# Patient Record
Sex: FEMALE | Race: WHITE | NOT HISPANIC OR LATINO | Employment: PART TIME | URBAN - METROPOLITAN AREA
[De-identification: names, ages, dates, MRNs, and addresses within clinical notes are randomized per-mention and may not be internally consistent; named-entity substitution may affect disease eponyms.]

---

## 2018-05-15 ENCOUNTER — APPOINTMENT (OUTPATIENT)
Dept: CT IMAGING | Facility: HOSPITAL | Age: 23
End: 2018-05-15

## 2018-05-15 ENCOUNTER — APPOINTMENT (OUTPATIENT)
Dept: ULTRASOUND IMAGING | Facility: HOSPITAL | Age: 23
End: 2018-05-15

## 2018-05-15 ENCOUNTER — HOSPITAL ENCOUNTER (EMERGENCY)
Facility: HOSPITAL | Age: 23
Discharge: HOME | End: 2018-05-15
Attending: EMERGENCY MEDICINE | Admitting: EMERGENCY MEDICINE

## 2018-05-15 ENCOUNTER — APPOINTMENT (OUTPATIENT)
Dept: GENERAL RADIOLOGY | Facility: HOSPITAL | Age: 23
End: 2018-05-15

## 2018-05-15 VITALS
OXYGEN SATURATION: 97 % | SYSTOLIC BLOOD PRESSURE: 97 MMHG | WEIGHT: 145 LBS | BODY MASS INDEX: 25.69 KG/M2 | HEIGHT: 63 IN | RESPIRATION RATE: 16 BRPM | TEMPERATURE: 98.1 F | HEART RATE: 95 BPM | DIASTOLIC BLOOD PRESSURE: 56 MMHG

## 2018-05-15 DIAGNOSIS — R16.0 HEPATOMEGALY: ICD-10-CM

## 2018-05-15 DIAGNOSIS — R79.89 ELEVATED LFTS: Primary | ICD-10-CM

## 2018-05-15 LAB
ALBUMIN SERPL-MCNC: 4.1 G/DL (ref 3.2–4.8)
ALBUMIN/GLOB SERPL: 1.2 G/DL (ref 1.5–2.5)
ALP SERPL-CCNC: 157 U/L (ref 25–100)
ALT SERPL W P-5'-P-CCNC: 465 U/L (ref 7–40)
ANION GAP SERPL CALCULATED.3IONS-SCNC: 4 MMOL/L (ref 3–11)
AST SERPL-CCNC: 328 U/L (ref 0–33)
B-HCG UR QL: NEGATIVE
BASOPHILS # BLD MANUAL: 0.14 10*3/MM3 (ref 0–0.2)
BASOPHILS NFR BLD AUTO: 1 % (ref 0–1)
BILIRUB SERPL-MCNC: 0.6 MG/DL (ref 0.3–1.2)
BUN BLD-MCNC: 9 MG/DL (ref 9–23)
BUN/CREAT SERPL: 12.9 (ref 7–25)
CALCIUM SPEC-SCNC: 9 MG/DL (ref 8.7–10.4)
CHLORIDE SERPL-SCNC: 104 MMOL/L (ref 99–109)
CO2 SERPL-SCNC: 30 MMOL/L (ref 20–31)
CREAT BLD-MCNC: 0.7 MG/DL (ref 0.6–1.3)
D DIMER PPP FEU-MCNC: 1.12 MG/L (FEU) (ref 0–0.5)
DEPRECATED RDW RBC AUTO: 46.1 FL (ref 37–54)
EOSINOPHIL # BLD MANUAL: 0.14 10*3/MM3 (ref 0.1–0.3)
EOSINOPHIL NFR BLD MANUAL: 1 % (ref 0–3)
ERYTHROCYTE [DISTWIDTH] IN BLOOD BY AUTOMATED COUNT: 14.6 % (ref 11.3–14.5)
GFR SERPL CREATININE-BSD FRML MDRD: 106 ML/MIN/1.73
GLOBULIN UR ELPH-MCNC: 3.4 GM/DL
GLUCOSE BLD-MCNC: 88 MG/DL (ref 70–100)
HAV IGM SERPL QL IA: NORMAL
HBV CORE IGM SERPL QL IA: NORMAL
HBV SURFACE AG SERPL QL IA: NORMAL
HCT VFR BLD AUTO: 37.6 % (ref 34.5–44)
HCV AB SER DONR QL: NORMAL
HGB BLD-MCNC: 12.3 G/DL (ref 11.5–15.5)
INTERNAL NEGATIVE CONTROL: REACTIVE
INTERNAL POSITIVE CONTROL: REACTIVE
LIPASE SERPL-CCNC: 31 U/L (ref 6–51)
LYMPHOCYTES # BLD MANUAL: 8.82 10*3/MM3 (ref 0.6–4.8)
LYMPHOCYTES NFR BLD MANUAL: 65 % (ref 24–44)
LYMPHOCYTES NFR BLD MANUAL: 9 % (ref 0–12)
Lab: NORMAL
MCH RBC QN AUTO: 28 PG (ref 27–31)
MCHC RBC AUTO-ENTMCNC: 32.7 G/DL (ref 32–36)
MCV RBC AUTO: 85.5 FL (ref 80–99)
MONOCYTES # BLD AUTO: 1.22 10*3/MM3 (ref 0–1)
NEUTROPHILS # BLD AUTO: 2.71 10*3/MM3 (ref 1.5–8.3)
NEUTROPHILS NFR BLD MANUAL: 13 % (ref 41–71)
NEUTS BAND NFR BLD MANUAL: 7 % (ref 0–5)
PLAT MORPH BLD: NORMAL
PLATELET # BLD AUTO: 205 10*3/MM3 (ref 150–450)
PMV BLD AUTO: 11.7 FL (ref 6–12)
POTASSIUM BLD-SCNC: 3.9 MMOL/L (ref 3.5–5.5)
PROT SERPL-MCNC: 7.5 G/DL (ref 5.7–8.2)
RBC # BLD AUTO: 4.4 10*6/MM3 (ref 3.89–5.14)
RBC MORPH BLD: NORMAL
SODIUM BLD-SCNC: 138 MMOL/L (ref 132–146)
VARIANT LYMPHS NFR BLD MANUAL: 4 % (ref 0–5)
WBC MORPH BLD: NORMAL
WBC NRBC COR # BLD: 13.57 10*3/MM3 (ref 4.5–13.5)

## 2018-05-15 PROCEDURE — 76705 ECHO EXAM OF ABDOMEN: CPT

## 2018-05-15 PROCEDURE — 71275 CT ANGIOGRAPHY CHEST: CPT

## 2018-05-15 PROCEDURE — 0 IOPAMIDOL PER 1 ML: Performed by: EMERGENCY MEDICINE

## 2018-05-15 PROCEDURE — 83690 ASSAY OF LIPASE: CPT | Performed by: PHYSICIAN ASSISTANT

## 2018-05-15 PROCEDURE — 71046 X-RAY EXAM CHEST 2 VIEWS: CPT

## 2018-05-15 PROCEDURE — 96374 THER/PROPH/DIAG INJ IV PUSH: CPT

## 2018-05-15 PROCEDURE — 85007 BL SMEAR W/DIFF WBC COUNT: CPT | Performed by: PHYSICIAN ASSISTANT

## 2018-05-15 PROCEDURE — 80074 ACUTE HEPATITIS PANEL: CPT | Performed by: PHYSICIAN ASSISTANT

## 2018-05-15 PROCEDURE — 99284 EMERGENCY DEPT VISIT MOD MDM: CPT

## 2018-05-15 PROCEDURE — 96375 TX/PRO/DX INJ NEW DRUG ADDON: CPT

## 2018-05-15 PROCEDURE — 25010000002 HYDROMORPHONE PER 4 MG

## 2018-05-15 PROCEDURE — 85379 FIBRIN DEGRADATION QUANT: CPT | Performed by: PHYSICIAN ASSISTANT

## 2018-05-15 PROCEDURE — 25010000002 ONDANSETRON PER 1 MG

## 2018-05-15 PROCEDURE — 80053 COMPREHEN METABOLIC PANEL: CPT | Performed by: PHYSICIAN ASSISTANT

## 2018-05-15 PROCEDURE — 85025 COMPLETE CBC W/AUTO DIFF WBC: CPT | Performed by: PHYSICIAN ASSISTANT

## 2018-05-15 RX ORDER — HYDROMORPHONE HYDROCHLORIDE 1 MG/ML
0.5 INJECTION, SOLUTION INTRAMUSCULAR; INTRAVENOUS; SUBCUTANEOUS ONCE
Status: COMPLETED | OUTPATIENT
Start: 2018-05-15 | End: 2018-05-15

## 2018-05-15 RX ORDER — ONDANSETRON 2 MG/ML
4 INJECTION INTRAMUSCULAR; INTRAVENOUS ONCE
Status: COMPLETED | OUTPATIENT
Start: 2018-05-15 | End: 2018-05-15

## 2018-05-15 RX ORDER — TRAMADOL HYDROCHLORIDE 50 MG/1
50 TABLET ORAL EVERY 6 HOURS PRN
Qty: 12 TABLET | Refills: 0 | Status: SHIPPED | OUTPATIENT
Start: 2018-05-15 | End: 2018-05-16 | Stop reason: HOSPADM

## 2018-05-15 RX ORDER — ONDANSETRON 2 MG/ML
INJECTION INTRAMUSCULAR; INTRAVENOUS
Status: COMPLETED
Start: 2018-05-15 | End: 2018-05-15

## 2018-05-15 RX ADMIN — HYDROMORPHONE HYDROCHLORIDE 0.5 MG: 1 INJECTION, SOLUTION INTRAMUSCULAR; INTRAVENOUS; SUBCUTANEOUS at 12:56

## 2018-05-15 RX ADMIN — IOPAMIDOL 50 ML: 755 INJECTION, SOLUTION INTRAVENOUS at 13:09

## 2018-05-15 RX ADMIN — ONDANSETRON 4 MG: 2 INJECTION INTRAMUSCULAR; INTRAVENOUS at 12:54

## 2018-05-15 NOTE — ED PROVIDER NOTES
Subjective   Patient's been having pain in her right upper quadrant going on for about 4 days.  Patient ports she was initially seen yesterday by her primary care doctor was noted to have elevated LFTs.  She had x-rays of the right kidney which were negative should a chest x-ray was negative.  Her liver functions are patient patient's family physician thought maybe that she had hepatitis A she works in  setting so as she had her sent to the emergency department for further evaluation.  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  She's had no nausea or vomiting associated with this.  Patient reports a pleuritic pain in the right posterior chest wall's well.  Patient reports she's had no productive cough no wheezing and no trauma to this area.  She takes no regular medications.  Palpation deftly exacerbates her pain as well as leaning to the right.  Alleviation of the pain done by not moving.        History provided by:  Patient   used: No    Abdominal Pain   Pain location:  R flank and RUQ  Pain quality: sharp and shooting    Pain radiates to:  Chest  Pain severity:  Moderate  Onset quality:  Gradual  Duration:  4 days  Timing:  Intermittent  Progression:  Waxing and waning  Chronicity:  New  Context: not alcohol use, not eating, not medication withdrawal, not previous surgeries, not recent illness, not recent travel, not retching, not sick contacts, not suspicious food intake and not trauma    Relieved by:  Not moving  Worsened by:  Movement  Ineffective treatments:  None tried  Associated symptoms: anorexia    Associated symptoms: no chest pain, no constipation, no dysuria, no fatigue, no fever, no flatus, no nausea, no shortness of breath, no vaginal bleeding, no vaginal discharge and no vomiting    Risk factors: no alcohol abuse        Review of Systems   Constitutional: Negative for  fatigue and fever.   Respiratory: Negative for chest tightness, shortness of breath and wheezing.    Cardiovascular: Negative for chest pain.   Gastrointestinal: Positive for abdominal pain and anorexia. Negative for constipation, flatus, nausea and vomiting.   Genitourinary: Negative for dysuria, vaginal bleeding and vaginal discharge.   Musculoskeletal: Negative for back pain and neck stiffness.   Skin: Negative for pallor and rash.   Neurological: Negative for dizziness and weakness.   Hematological: Negative for adenopathy.   Psychiatric/Behavioral: Negative.    All other systems reviewed and are negative.      History reviewed. No pertinent past medical history.    No Known Allergies    Past Surgical History:   Procedure Laterality Date   • EAR TUBES     • TONSILLECTOMY AND ADENOIDECTOMY         Family History   Problem Relation Age of Onset   • Esophageal cancer Paternal Grandfather        Social History     Social History   • Marital status: Single     Social History Main Topics   • Smoking status: Never Smoker   • Smokeless tobacco: Never Used   • Alcohol use No   • Drug use: No     Other Topics Concern   • Not on file           Objective   Physical Exam   Constitutional: She is oriented to person, place, and time. She appears well-developed and well-nourished.   HENT:   Head: Normocephalic and atraumatic.   Right Ear: External ear normal.   Left Ear: External ear normal.   Nose: Nose normal.   Mouth/Throat: Oropharynx is clear and moist.   Eyes: Conjunctivae and EOM are normal. Pupils are equal, round, and reactive to light. No scleral icterus.   Neck: Normal range of motion. No thyromegaly present.   Cardiovascular: Normal rate, regular rhythm and normal heart sounds.    Pulmonary/Chest: Effort normal and breath sounds normal. No respiratory distress. She has no wheezes. She has no rales. She exhibits no tenderness.   Abdominal: Soft. Bowel sounds are normal. She exhibits no distension. There is tenderness  (tenderness right upper quadrant.  She has a positive Louise sign. ).   Musculoskeletal: Normal range of motion.   Lymphadenopathy:     She has no cervical adenopathy.   Neurological: She is alert and oriented to person, place, and time. She has normal reflexes. She displays normal reflexes. No cranial nerve deficit. Coordination normal.   Skin: Skin is warm and dry.   No rash or lesions.   Psychiatric: She has a normal mood and affect. Her behavior is normal. Judgment and thought content normal.   Nursing note and vitals reviewed.      Procedures           ED Course  ED Course as of May 20 2247   Tue May 15, 2018   1255 Hep A IgM: Non-Reactive [LORENA]   1416 Discussed the findings with the patient and her parents.  She must stay away from a hepatic toxic medications as well as alcohol.  She referred to gastrology on-call.  Follow-up with her primary care doctor as well.  [LORENA]      ED Course User Index  [LORENA] RAHAT Lu        No results found for this or any previous visit (from the past 24 hour(s)).  Note: In addition to lab results from this visit, the labs listed above may include labs taken at another facility or during a different encounter within the last 24 hours. Please correlate lab times with ED admission and discharge times for further clarification of the services performed during this visit.    CT Angiogram Chest With Contrast   Final Result   1.  No PE.   2.  No acute intrathoracic findings.   3.  Hepatosplenomegaly on partially imaged abdomen.       D:  05/15/2018   E:  05/16/2018               This report was finalized on 5/17/2018 8:28 AM by Dr. Ivan Lorenz.          XR Chest 2 View   Final Result   No acute cardiopulmonary process.       D:  05/15/2018   E:  05/15/2018       This report was finalized on 5/15/2018 4:48 PM by Dr. Ivan Lorenz.          US Gallbladder   Final Result   1.  Liver appears prominent for size without focal lesion.   2.  Incidental note of splenomegaly.   3.   Microlithiasis and/or sludge within the dependent portion of the   gallbladder, however, no evidence for gallbladder wall thickening or   pericholecystic fluid.       D:  05/15/2018   E:  05/15/2018                This report was finalized on 5/15/2018 4:47 PM by Dr. Ivan Lorenz.            Vitals:    05/15/18 1001 05/15/18 1230 05/15/18 1404 05/15/18 1449   BP:  114/82 114/69 97/56   BP Location:   Left arm    Patient Position:   Lying    Pulse:  88 85 95   Resp:    16   Temp:       TempSrc:       SpO2: 95% 97% 97% 97%   Weight:       Height:         Medications   HYDROmorphone (DILAUDID) injection 0.5 mg (0.5 mg Intravenous Given 5/15/18 1256)   ondansetron (ZOFRAN) injection 4 mg (4 mg Intravenous Given 5/15/18 1254)   iopamidol (ISOVUE-370) 76 % injection 100 mL (50 mL Intravenous Given 5/15/18 1309)     ECG/EMG Results (last 24 hours)     ** No results found for the last 24 hours. **                  MDM  Number of Diagnoses or Management Options  Elevated LFTs: new and requires workup  Hepatomegaly: new and requires workup     Amount and/or Complexity of Data Reviewed  Clinical lab tests: reviewed and ordered  Tests in the radiology section of CPT®: reviewed and ordered  Tests in the medicine section of CPT®: ordered and reviewed  Discuss the patient with other providers: yes    Patient Progress  Patient progress: stable        Final diagnoses:   Elevated LFTs   Hepatomegaly            RAHAT Lu  05/20/18 3992

## 2018-05-15 NOTE — DISCHARGE INSTRUCTIONS
Please call ASAP to arrange outpatient follow up with one of the following gastroenterologists:    Dom Jensen MD or Quan May MD  [address]  [place] [address] [phone]    Or    If unable to make a timely appointment with Dr. Jesnen or Dr. Mya, please follow up with one of these gastroenterologists:    Shalom Sommer MD or Flavio Gray MD  [id number]  [place] [address] [phone]    If follow up with these specialists cannot be arranged soon, please also follow up with a primary care provider, next available.

## 2018-05-16 ENCOUNTER — OFFICE VISIT (OUTPATIENT)
Dept: GASTROENTEROLOGY | Facility: CLINIC | Age: 23
End: 2018-05-16

## 2018-05-16 ENCOUNTER — LAB (OUTPATIENT)
Dept: LAB | Facility: HOSPITAL | Age: 23
End: 2018-05-16

## 2018-05-16 VITALS
TEMPERATURE: 98.1 F | OXYGEN SATURATION: 97 % | HEIGHT: 63 IN | WEIGHT: 142.6 LBS | BODY MASS INDEX: 25.27 KG/M2 | SYSTOLIC BLOOD PRESSURE: 102 MMHG | HEART RATE: 98 BPM | DIASTOLIC BLOOD PRESSURE: 68 MMHG

## 2018-05-16 DIAGNOSIS — R10.11 RUQ ABDOMINAL PAIN: ICD-10-CM

## 2018-05-16 DIAGNOSIS — R11.0 NAUSEA: ICD-10-CM

## 2018-05-16 DIAGNOSIS — R79.89 LFT ELEVATION: ICD-10-CM

## 2018-05-16 DIAGNOSIS — R79.89 LFT ELEVATION: Primary | ICD-10-CM

## 2018-05-16 LAB
ALBUMIN SERPL-MCNC: 4.4 G/DL (ref 3.2–4.8)
ALBUMIN/GLOB SERPL: 1.3 G/DL (ref 1.5–2.5)
ALP SERPL-CCNC: 181 U/L (ref 25–100)
ALT SERPL W P-5'-P-CCNC: 728 U/L (ref 7–40)
AMPHET+METHAMPHET UR QL: NEGATIVE
AMPHETAMINES UR QL: NEGATIVE
ANION GAP SERPL CALCULATED.3IONS-SCNC: 6 MMOL/L (ref 3–11)
AST SERPL-CCNC: 493 U/L (ref 0–33)
BARBITURATES UR QL SCN: NEGATIVE
BENZODIAZ UR QL SCN: NEGATIVE
BILIRUB SERPL-MCNC: 0.8 MG/DL (ref 0.3–1.2)
BILIRUB UR QL STRIP: NEGATIVE
BUN BLD-MCNC: 11 MG/DL (ref 9–23)
BUN/CREAT SERPL: 13.8 (ref 7–25)
BUPRENORPHINE SERPL-MCNC: NEGATIVE NG/ML
CALCIUM SPEC-SCNC: 9.1 MG/DL (ref 8.7–10.4)
CANNABINOIDS SERPL QL: NEGATIVE
CHLORIDE SERPL-SCNC: 101 MMOL/L (ref 99–109)
CLARITY UR: CLEAR
CO2 SERPL-SCNC: 30 MMOL/L (ref 20–31)
COCAINE UR QL: NEGATIVE
COLOR UR: YELLOW
CREAT BLD-MCNC: 0.8 MG/DL (ref 0.6–1.3)
GFR SERPL CREATININE-BSD FRML MDRD: 91 ML/MIN/1.73
GGT SERPL-CCNC: 126 U/L (ref 0–37)
GLOBULIN UR ELPH-MCNC: 3.5 GM/DL
GLUCOSE BLD-MCNC: 95 MG/DL (ref 70–100)
GLUCOSE UR STRIP-MCNC: NEGATIVE MG/DL
HBV SURFACE AB SER RIA-ACNC: NORMAL
HGB UR QL STRIP.AUTO: NEGATIVE
KETONES UR QL STRIP: NEGATIVE
LEUKOCYTE ESTERASE UR QL STRIP.AUTO: NEGATIVE
METHADONE UR QL SCN: NEGATIVE
NITRITE UR QL STRIP: NEGATIVE
OPIATES UR QL: NEGATIVE
OXYCODONE UR QL SCN: NEGATIVE
PCP UR QL SCN: NEGATIVE
PH UR STRIP.AUTO: 6 [PH] (ref 5–8)
POTASSIUM BLD-SCNC: 4.1 MMOL/L (ref 3.5–5.5)
PROPOXYPH UR QL: NEGATIVE
PROT SERPL-MCNC: 7.9 G/DL (ref 5.7–8.2)
PROT UR QL STRIP: NEGATIVE
SODIUM BLD-SCNC: 137 MMOL/L (ref 132–146)
SP GR UR STRIP: 1.01 (ref 1–1.03)
TRICYCLICS UR QL SCN: NEGATIVE
UROBILINOGEN UR QL STRIP: NORMAL

## 2018-05-16 PROCEDURE — 99204 OFFICE O/P NEW MOD 45 MIN: CPT | Performed by: NURSE PRACTITIONER

## 2018-05-16 PROCEDURE — 82977 ASSAY OF GGT: CPT

## 2018-05-16 PROCEDURE — 80306 DRUG TEST PRSMV INSTRMNT: CPT

## 2018-05-16 PROCEDURE — 86431 RHEUMATOID FACTOR QUANT: CPT

## 2018-05-16 PROCEDURE — 86430 RHEUMATOID FACTOR TEST QUAL: CPT

## 2018-05-16 PROCEDURE — 80053 COMPREHEN METABOLIC PANEL: CPT

## 2018-05-16 PROCEDURE — 83516 IMMUNOASSAY NONANTIBODY: CPT

## 2018-05-16 PROCEDURE — 81003 URINALYSIS AUTO W/O SCOPE: CPT

## 2018-05-16 PROCEDURE — 86665 EPSTEIN-BARR CAPSID VCA: CPT | Performed by: NURSE PRACTITIONER

## 2018-05-16 PROCEDURE — 86255 FLUORESCENT ANTIBODY SCREEN: CPT

## 2018-05-16 PROCEDURE — 86706 HEP B SURFACE ANTIBODY: CPT

## 2018-05-16 PROCEDURE — 86038 ANTINUCLEAR ANTIBODIES: CPT

## 2018-05-16 PROCEDURE — 86663 EPSTEIN-BARR ANTIBODY: CPT | Performed by: NURSE PRACTITIONER

## 2018-05-16 PROCEDURE — 86645 CMV ANTIBODY IGM: CPT

## 2018-05-16 PROCEDURE — 87522 HEPATITIS C REVRS TRNSCRPJ: CPT

## 2018-05-16 PROCEDURE — 36415 COLL VENOUS BLD VENIPUNCTURE: CPT

## 2018-05-16 PROCEDURE — 82784 ASSAY IGA/IGD/IGG/IGM EACH: CPT

## 2018-05-16 PROCEDURE — 86376 MICROSOMAL ANTIBODY EACH: CPT

## 2018-05-16 PROCEDURE — 86708 HEPATITIS A ANTIBODY: CPT

## 2018-05-16 PROCEDURE — 86664 EPSTEIN-BARR NUCLEAR ANTIGEN: CPT | Performed by: NURSE PRACTITIONER

## 2018-05-16 PROCEDURE — 82390 ASSAY OF CERULOPLASMIN: CPT

## 2018-05-16 RX ORDER — FLUTICASONE PROPIONATE 50 MCG
2 SPRAY, SUSPENSION (ML) NASAL AS NEEDED
COMMUNITY
End: 2022-09-19

## 2018-05-16 RX ORDER — ONDANSETRON 4 MG/1
4 TABLET, ORALLY DISINTEGRATING ORAL 4 TIMES DAILY PRN
Qty: 30 TABLET | Refills: 1 | Status: SHIPPED | OUTPATIENT
Start: 2018-05-16 | End: 2022-06-01

## 2018-05-16 NOTE — PROGRESS NOTES
GASTROENTEROLOGY OUTPATIENT NEW PATIENT NOTE  Patient: JOSE ARREDONDO : 1995  Date of Service: 2018  Dear David GIANG  Thank you for your referral of this patient for evaluation of LFT elevation  CC: Elevated Hepatic Enzymes  Assessment/Plan                                             ASSESSMENT & PLANS     Acute, severe LFT elevation likely to be related to autoimmune hepatitis versus mono/acute EBV infection versus biliary cause  RUQ abdominal pain related to problem #1  Microlithiasis and/or sludge, but no evidence for gallbladder wall thickening or biliary dilatation  Splenomgaly, but no thrombopenia. Measuring 15.7 cm in craniocaudal dimension per US  -     Hepatitis A Antibody, Total; Future  -     Hepatitis B Surface Antibody; Future  -     Gamma GT; Future  -     Mitochondrial Antibodies, M2; Future  -     Rheumatoid Factor; Future  -     Antinuclear Antibody With Reflex Cascade; Future  -     Anti-microsomal Antibody; Future  -     Anti-Smooth Muscle Antibody Titer; Future  -     Ceruloplasmin; Future  -     Celiac Disease Panel; Future  -     UA / M With / Rflx Culture([facility] ONLY) - Urine, Clean Catch; Future  -     Cytomegalovirus Antibody, IgM; Future  -     Magalys-Barr Virus VCA Antibody Panel  -     Soluble Liver Ag (IgG Ab); Future  -     Urine Drug Screen - Urine, Clean Catch; Future  -     Hepatitis C RNA, Quantitative, PCR (graph); Future  -     Comprehensive Metabolic Panel; Future  -     NM HIDA Scan With Pharmacological Intervention; Future  -     May take PRN Ultram for abd pain as given by ER yesterday.      Nausea  -     Start ondansetron ODT (ZOFRAN-ODT) 4 MG disintegrating tablet; Place 1 tablet under the tongue 4 (Four) Times a Day As Needed for Nausea or Vomiting.    Follow Up: Patient's case is discussed w/ Dr May, who agrees to plan above.  Pt is instructed if she has worsening abdominal pain, become jaundice, fever, patient is to report to the ER  immediately.   Pt is instructed to avoid activities (ie contact sports) that can cause injury to the spleen.   Pt is instructed to for fluid and stay hydrated.  I suggested that pt takes Zofran TID for a couple days first then PRN    DISCUSSION: The above plan was delineated in details with patient and mother and all questions and concerns were answered.  Patient is also given contact information.  Patient is to return as scheduled or sooner if new problems arise  Subjective                                                     SUBJECTIVE   History of Present Illness  Ms. Dacia Ortiz is a 23 y.o. female presents to the clinic today for an evaluation of Elevated Hepatic Enzymes  Pt presented w/ 1 wk of progressively worsening R flank/RUQ abd pain, anorexia, nausea, then subjective/low grade fever (100.0) yesterday. Work up at PCP’s office showed elevated LFT.  Patient went to the ER yesterday. Kidney stone and acute hepatitis infection was ruled out.  D-dimer was elevated, but CT scans chest ruled out PE.  Gallbladder ultrasound showed sludge, nonobstructing kidney stone, and incidental finding of splenomegaly    Pt was given Tramadol PRN, and urgently referred to us for evaluation    Patient reports of moderately severe, sharp shooting R flank/RUQ abd pain. Sometimes radiates to chest.  Pain is constant, but worsens with moving, activity, and tender to touch.  Palpation deftly exacerbates her pain as well as leaning to the right.  Alleviation of the pain done by not moving.  Laying still makes pain better.  Correlation of pain with eating or bowel movement. Has been eating very little due to nausea. At baseline, pt is able to have have a BM once every other day w/ normal stool, but pt has been having less frequent BMs this past wk d/t decreased intake. No jaundice.  +++Dark urine.  No dysuria.  No urinary frequency.  UTI has been ruled out.  Patient reports that  Increased fluid intake to stay hydrated.  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  She takes no regular medications.   Mother has the celiac gene. Fam hx of thyroid dz, but not sure if it's autoimmune. No alcohol use. No previous surgeries. No recent illness/  No known sick contacts. No suspicious food     ROS:Review of Systems   Constitutional: Positive for appetite change, fatigue and fever. Negative for activity change and unexpected weight change.   HENT: Negative for hearing loss, trouble swallowing and voice change.    Eyes: Negative for visual disturbance.   Respiratory: Positive for cough. Negative for choking, chest tightness and shortness of breath.    Cardiovascular: Negative for chest pain.   Gastrointestinal: Positive for abdominal distention (bloating), abdominal pain and nausea. Negative for anal bleeding, blood in stool, constipation, diarrhea, rectal pain and vomiting.   Endocrine: Negative for polydipsia and polyphagia.   Genitourinary: Negative.    Musculoskeletal: Positive for back pain. Negative for gait problem and joint swelling.   Skin: Negative for color change and rash.   Allergic/Immunologic: Negative for food allergies.   Neurological: Negative for dizziness, seizures and speech difficulty.   Hematological: Negative for adenopathy.   Psychiatric/Behavioral: Negative for confusion.     Subjective   PAST MED HX: Pt  has no past medical history on file.  PAST SURG HX: Pt  has a past surgical history that includes Ear Tubes Removal and tonsillectomy and adenoidectomy.  FAM HX: family history includes Esophageal cancer in her paternal grandfather.  SOC HX: Pt  reports that she has never smoked. She has never used smokeless tobacco. She reports that she does not drink alcohol or use drugs.  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  In school [employer]  Objective                                                           OBJECTIVE    Allergy: Pt has No Known Allergies.  MEDS: •  fluticasone (FLONASE) 50 MCG/ACT nasal spray, 2 sprays into each nostril As Needed for Rhinitis., Disp: , Rfl:   Lab Results   Component Value Date    WBC 13.57 (H) 05/15/2018    HGB 12.3 05/15/2018    HCT 37.6 05/15/2018    MCV 85.5 05/15/2018    MCHC 32.7 05/15/2018     05/15/2018     Lab Results   Component Value Date     05/15/2018    K 3.9 05/15/2018     05/15/2018    CO2 30.0 05/15/2018    BUN 9 05/15/2018    CREATININE 0.70 05/15/2018    EGFRIFNONA 106 05/15/2018    GLUCOSE 88 05/15/2018    CALCIUM 9.0 05/15/2018    ANIONGAP 4.0 05/15/2018     Lab Results   Component Value Date     (H) 05/15/2018     (H) 05/15/2018    ALKPHOS 157 (H) 05/15/2018    BILITOT 0.6 05/15/2018    PROTEINTOT 7.5 05/15/2018    ALBUMIN 4.10 05/15/2018     Lab Results   Component Value Date    LIPASE 31 05/15/2018     Lab Results   Component Value Date    HEPAIGM Non-Reactive 05/15/2018    HEPBSAG Non-Reactive 05/15/2018    HEPBIGMCORE Non-Reactive 05/15/2018    HEPCAB Non-Reactive 05/15/2018     EXAMINATION: US GALLBLADDER-05/15/2018:  FINDINGS:   Visualized portions of the pancreas within normal limits.     Liver demonstrates normal echogenicity and echotexture and noted with  prominence of size.     Gallbladder is present without evidence for cholelithiasis, however,  microlithiasis and/or sludge in the dependent portion. No gallbladder  wall thickening or pericholecystic fluid.     Common bile duct measures 5 mm in diameter at the level of the nataliya  hepatis.     Right kidney measures 10.8 cm in length without evidence for  hydronephrosis.     Incidental note of enlarged spleen measuring 15.7 cm in craniocaudal  dimension on limited imaging.     IMPRESSION:  1.  Liver appears prominent for size without focal lesion.  2.  Incidental note of splenomegaly.  3.  Microlithiasis and/or sludge within the dependent portion of the  gallbladder, however, no  evidence for gallbladder wall thickening or  pericholecystic fluid.    Wt Readings from Last 5 Encounters:   05/16/18 64.7 kg (142 lb 9.6 oz)   05/15/18 65.8 kg (145 lb)   body mass index is 25.26 kg/m².,Temp: 98.1 °F (36.7 °C),BP: 102/68,Heart Rate: 98   Physical Exam  General Well developed; well nourished; no acute distress.   ENT Oral mucosa pink and moist without thrush or lesions.    Neck Neck supple; trachea midline. No thyromegaly   Resp CTA; no rhonchi, rales, or wheezes.  Respiration effort normal  CV RRR; normal S1, S2; no M/R/G. No lower extremity edema  GI Abd soft,moderately tender to palpation, but no guarding or rebound, ND, normal active bowel sounds.  No HSM.  No abd hernia  Skin No rash; no lesions; no bruises.  Skin turgor normal  Musc No clubbing; no cyanosis.  Gait steady  Psych Oriented to time, place, and person.  Appropriate affect  Thank you kindly for allowing me to be part of this patient’s care.    Pt care team: Airam CARDONA & Pritesh Cowart [facility]  05/16/183:18 PM  [facility]--Gastroenterology  [phone]    CC: David GIANG

## 2018-05-17 ENCOUNTER — TELEPHONE (OUTPATIENT)
Dept: GASTROENTEROLOGY | Facility: CLINIC | Age: 23
End: 2018-05-17

## 2018-05-17 DIAGNOSIS — K75.9 HEPATITIS: Primary | ICD-10-CM

## 2018-05-17 LAB
ACTIN IGG SERPL-ACNC: 23 UNITS (ref 0–19)
ALP LIVER CFR SERPL: <1 UNITS (ref 0–20)
ANA SER QL: NEGATIVE
CERULOPLASMIN SERPL-MCNC: 37.5 MG/DL (ref 19–39)
CMV IGM SERPL IA-ACNC: 49.1 AU/ML (ref 0–29.9)
DEPRECATED MITOCHONDRIA M2 IGG SER-ACNC: <20 UNITS (ref 0–20)
EBV EA IGG SER-ACNC: >150 U/ML (ref 0–8.9)
EBV NA IGG SER IA-ACNC: <18 U/ML (ref 0–17.9)
EBV VCA IGG SER-ACNC: 82 U/ML (ref 0–17.9)
EBV VCA IGM SER-ACNC: >160 U/ML (ref 0–35.9)
ENDOMYSIUM IGA SER QL: NEGATIVE
HAV AB SER QL IA: POSITIVE
IGA SERPL-MCNC: 286 MG/DL (ref 87–352)
INTERPRETATION: ABNORMAL
Lab: NORMAL
RHEUMATOID FACT SERPL-ACNC: POSITIVE [IU]/ML
RHEUMATOID FACT TITR SER: NORMAL IU/ML
TTG IGA SER-ACNC: <2 U/ML (ref 0–3)

## 2018-05-17 NOTE — TELEPHONE ENCOUNTER
Patient left voicemail with questions about her rx that Airam gave her yesterday. Called patient back. Patient states she spoke with Pritesh and got it fixed.

## 2018-05-18 ENCOUNTER — APPOINTMENT (OUTPATIENT)
Dept: NUCLEAR MEDICINE | Facility: HOSPITAL | Age: 23
End: 2018-05-18

## 2018-05-18 LAB
HCV RNA SERPL NAA+PROBE-ACNC: NORMAL IU/ML
TEST INFORMATION: NORMAL

## 2018-05-22 ENCOUNTER — TELEPHONE (OUTPATIENT)
Dept: GASTROENTEROLOGY | Facility: CLINIC | Age: 23
End: 2018-05-22

## 2018-05-22 LAB — SOLUBLE LIVER IGG SER IA-ACNC: 1.7 UNITS (ref 0–20)

## 2018-05-22 NOTE — TELEPHONE ENCOUNTER
Airam,  Patient stated that she doesn't have the pain & nausea. Only when she is up doing too much activity. Also informed patient that she doesn't have to get Hida Scan.

## 2018-05-22 NOTE — TELEPHONE ENCOUNTER
When Dr May talked to pt last Thur 5/17, he also told her that she needs her labs repeated this wk.  Order is in. Pls come and get it this tomorrow (Wed) or Thur.

## 2018-05-22 NOTE — TELEPHONE ENCOUNTER
Airam,  Patient was a know show for Hida Scan appointment. She stated that she talked to Dr May last week and he stated that her labs showed Mono; that may be the cause of her abnormal liver enzymes. He didn't think she needed the Hida Scan. Patient stated if you still wanted her to get it done. She will. Thanks

## 2018-05-23 ENCOUNTER — LAB (OUTPATIENT)
Dept: LAB | Facility: HOSPITAL | Age: 23
End: 2018-05-23

## 2018-05-23 ENCOUNTER — APPOINTMENT (OUTPATIENT)
Dept: NUCLEAR MEDICINE | Facility: HOSPITAL | Age: 23
End: 2018-05-23

## 2018-05-23 DIAGNOSIS — K75.9 HEPATITIS: ICD-10-CM

## 2018-05-23 LAB
ALBUMIN SERPL-MCNC: 4.4 G/DL (ref 3.2–4.8)
ALBUMIN/GLOB SERPL: 1.4 G/DL (ref 1.5–2.5)
ALP SERPL-CCNC: 137 U/L (ref 25–100)
ALT SERPL W P-5'-P-CCNC: 249 U/L (ref 7–40)
ANION GAP SERPL CALCULATED.3IONS-SCNC: 4 MMOL/L (ref 3–11)
AST SERPL-CCNC: 81 U/L (ref 0–33)
BILIRUB SERPL-MCNC: 0.6 MG/DL (ref 0.3–1.2)
BUN BLD-MCNC: 11 MG/DL (ref 9–23)
BUN/CREAT SERPL: 13.8 (ref 7–25)
CALCIUM SPEC-SCNC: 9.2 MG/DL (ref 8.7–10.4)
CHLORIDE SERPL-SCNC: 104 MMOL/L (ref 99–109)
CO2 SERPL-SCNC: 32 MMOL/L (ref 20–31)
CREAT BLD-MCNC: 0.8 MG/DL (ref 0.6–1.3)
GFR SERPL CREATININE-BSD FRML MDRD: 91 ML/MIN/1.73
GLOBULIN UR ELPH-MCNC: 3.1 GM/DL
GLUCOSE BLD-MCNC: 87 MG/DL (ref 70–100)
POTASSIUM BLD-SCNC: 5.2 MMOL/L (ref 3.5–5.5)
PROT SERPL-MCNC: 7.5 G/DL (ref 5.7–8.2)
SODIUM BLD-SCNC: 140 MMOL/L (ref 132–146)

## 2018-05-23 PROCEDURE — 80053 COMPREHEN METABOLIC PANEL: CPT

## 2018-05-23 PROCEDURE — 82784 ASSAY IGA/IGD/IGG/IGM EACH: CPT

## 2018-05-23 PROCEDURE — 36415 COLL VENOUS BLD VENIPUNCTURE: CPT

## 2018-05-24 DIAGNOSIS — R79.89 ABNORMAL LIVER FUNCTION TESTS: Primary | ICD-10-CM

## 2018-05-24 LAB
IGA SERPL-MCNC: 263 MG/DL (ref 87–352)
IGG SERPL-MCNC: 1306 MG/DL (ref 700–1600)
IGM SERPL-MCNC: 251 MG/DL (ref 26–217)

## 2018-06-08 ENCOUNTER — LAB (OUTPATIENT)
Dept: LAB | Facility: HOSPITAL | Age: 23
End: 2018-06-08

## 2018-06-08 DIAGNOSIS — R79.89 ABNORMAL LIVER FUNCTION TESTS: ICD-10-CM

## 2018-06-08 LAB
ALBUMIN SERPL-MCNC: 4.48 G/DL (ref 3.2–4.8)
ALBUMIN/GLOB SERPL: 1.5 G/DL (ref 1.5–2.5)
ALP SERPL-CCNC: 74 U/L (ref 25–100)
ALT SERPL W P-5'-P-CCNC: 39 U/L (ref 7–40)
ANION GAP SERPL CALCULATED.3IONS-SCNC: 4 MMOL/L (ref 3–11)
AST SERPL-CCNC: 32 U/L (ref 0–33)
BILIRUB SERPL-MCNC: 0.7 MG/DL (ref 0.3–1.2)
BUN BLD-MCNC: 10 MG/DL (ref 9–23)
BUN/CREAT SERPL: 14.7 (ref 7–25)
CALCIUM SPEC-SCNC: 9.4 MG/DL (ref 8.7–10.4)
CHLORIDE SERPL-SCNC: 104 MMOL/L (ref 99–109)
CO2 SERPL-SCNC: 31 MMOL/L (ref 20–31)
CREAT BLD-MCNC: 0.68 MG/DL (ref 0.6–1.3)
GFR SERPL CREATININE-BSD FRML MDRD: 109 ML/MIN/1.73
GLOBULIN UR ELPH-MCNC: 3 GM/DL
GLUCOSE BLD-MCNC: 75 MG/DL (ref 70–100)
POTASSIUM BLD-SCNC: 4.1 MMOL/L (ref 3.5–5.5)
PROT SERPL-MCNC: 7.5 G/DL (ref 5.7–8.2)
SODIUM BLD-SCNC: 139 MMOL/L (ref 132–146)

## 2018-06-08 PROCEDURE — 36415 COLL VENOUS BLD VENIPUNCTURE: CPT

## 2018-06-08 PROCEDURE — 80053 COMPREHEN METABOLIC PANEL: CPT

## 2018-06-11 ENCOUNTER — TELEPHONE (OUTPATIENT)
Dept: GASTROENTEROLOGY | Facility: CLINIC | Age: 23
End: 2018-06-11

## 2018-06-11 NOTE — TELEPHONE ENCOUNTER
----- Message from Celio May MD sent at 6/8/2018  2:53 PM EDT -----  Please give delvis and lazaro the good news that her liver enzymes are completely normal. Favor a mono virus that caused this condition.

## 2021-10-12 ENCOUNTER — TRANSCRIBE ORDERS (OUTPATIENT)
Dept: LAB | Facility: HOSPITAL | Age: 26
End: 2021-10-12

## 2021-10-12 ENCOUNTER — LAB (OUTPATIENT)
Dept: LAB | Facility: HOSPITAL | Age: 26
End: 2021-10-12

## 2021-10-12 DIAGNOSIS — Z32.01 PREGNANCY EXAMINATION OR TEST, POSITIVE RESULT: Primary | ICD-10-CM

## 2021-10-27 ENCOUNTER — LAB (OUTPATIENT)
Dept: LAB | Facility: HOSPITAL | Age: 26
End: 2021-10-27

## 2021-10-27 ENCOUNTER — TRANSCRIBE ORDERS (OUTPATIENT)
Dept: LAB | Facility: HOSPITAL | Age: 26
End: 2021-10-27

## 2021-10-27 DIAGNOSIS — Z32.01 PREGNANCY EXAMINATION OR TEST, POSITIVE RESULT: ICD-10-CM

## 2021-10-27 LAB
HCYS SERPL-MCNC: 5.1 UMOL/L (ref 0–15)
PROGEST SERPL-MCNC: 25.1 NG/ML

## 2021-10-27 PROCEDURE — 36415 COLL VENOUS BLD VENIPUNCTURE: CPT

## 2021-10-28 LAB
ABO GROUP BLD: NORMAL
BLD GP AB SCN SERPL QL: NEGATIVE
HCG INTACT+B SERPL-ACNC: NORMAL MIU/ML
RH BLD: POSITIVE

## 2022-05-29 ENCOUNTER — HOSPITAL ENCOUNTER (INPATIENT)
Facility: HOSPITAL | Age: 27
LOS: 3 days | Discharge: HOME | End: 2022-06-01
Attending: OBSTETRICS & GYNECOLOGY | Admitting: OBSTETRICS & GYNECOLOGY

## 2022-05-29 PROBLEM — Z37.9 NORMAL LABOR: Status: ACTIVE | Noted: 2022-05-29

## 2022-05-29 LAB
ABO GROUP BLD: NORMAL
AMPHET+METHAMPHET UR QL: NEGATIVE
AMPHETAMINES UR QL: NEGATIVE
BARBITURATES UR QL SCN: NEGATIVE
BENZODIAZ UR QL SCN: NEGATIVE
BLD GP AB SCN SERPL QL: NEGATIVE
BUPRENORPHINE SERPL-MCNC: NEGATIVE NG/ML
CANNABINOIDS SERPL QL: NEGATIVE
COCAINE UR QL: NEGATIVE
DEPRECATED RDW RBC AUTO: 38.4 FL (ref 37–54)
ERYTHROCYTE [DISTWIDTH] IN BLOOD BY AUTOMATED COUNT: 13.2 % (ref 12.3–15.4)
HCT VFR BLD AUTO: 33.9 % (ref 34–46.6)
HGB BLD-MCNC: 11.3 G/DL (ref 12–15.9)
MCH RBC QN AUTO: 27 PG (ref 26.6–33)
MCHC RBC AUTO-ENTMCNC: 33.3 G/DL (ref 31.5–35.7)
MCV RBC AUTO: 80.9 FL (ref 79–97)
METHADONE UR QL SCN: NEGATIVE
OPIATES UR QL: NEGATIVE
OXYCODONE UR QL SCN: NEGATIVE
PCP UR QL SCN: NEGATIVE
PLATELET # BLD AUTO: 275 10*3/MM3 (ref 140–450)
PMV BLD AUTO: 11.5 FL (ref 6–12)
PROPOXYPH UR QL: NEGATIVE
RBC # BLD AUTO: 4.19 10*6/MM3 (ref 3.77–5.28)
RH BLD: POSITIVE
SARS-COV-2 RDRP RESP QL NAA+PROBE: NORMAL
T&S EXPIRATION DATE: NORMAL
TRICYCLICS UR QL SCN: NEGATIVE
WBC NRBC COR # BLD: 9.95 10*3/MM3 (ref 3.4–10.8)

## 2022-05-29 PROCEDURE — 86900 BLOOD TYPING SEROLOGIC ABO: CPT | Performed by: OBSTETRICS & GYNECOLOGY

## 2022-05-29 PROCEDURE — 80306 DRUG TEST PRSMV INSTRMNT: CPT | Performed by: OBSTETRICS & GYNECOLOGY

## 2022-05-29 PROCEDURE — 59025 FETAL NON-STRESS TEST: CPT

## 2022-05-29 PROCEDURE — 99221 1ST HOSP IP/OBS SF/LOW 40: CPT | Performed by: OBSTETRICS & GYNECOLOGY

## 2022-05-29 PROCEDURE — 86850 RBC ANTIBODY SCREEN: CPT | Performed by: OBSTETRICS & GYNECOLOGY

## 2022-05-29 PROCEDURE — 86901 BLOOD TYPING SEROLOGIC RH(D): CPT | Performed by: OBSTETRICS & GYNECOLOGY

## 2022-05-29 PROCEDURE — 85027 COMPLETE CBC AUTOMATED: CPT | Performed by: OBSTETRICS & GYNECOLOGY

## 2022-05-29 PROCEDURE — 25010000002 PENICILLIN G POTASSIUM PER 600000 UNITS: Performed by: NURSE PRACTITIONER

## 2022-05-29 PROCEDURE — 87635 SARS-COV-2 COVID-19 AMP PRB: CPT | Performed by: OBSTETRICS & GYNECOLOGY

## 2022-05-29 RX ORDER — ERYTHROMYCIN 5 MG/G
OINTMENT OPHTHALMIC
Status: DISCONTINUED
Start: 2022-05-29 | End: 2022-06-01 | Stop reason: HOSPADM

## 2022-05-29 RX ORDER — PENICILLIN G 3000000 [IU]/50ML
3 INJECTION, SOLUTION INTRAVENOUS EVERY 4 HOURS
Status: DISCONTINUED | OUTPATIENT
Start: 2022-05-29 | End: 2022-05-30 | Stop reason: HOSPADM

## 2022-05-29 RX ORDER — PRENATAL WITH FERROUS FUM AND FOLIC ACID 3080; 920; 120; 400; 22; 1.84; 3; 20; 10; 1; 12; 200; 27; 25; 2 [IU]/1; [IU]/1; MG/1; [IU]/1; MG/1; MG/1; MG/1; MG/1; MG/1; MG/1; UG/1; MG/1; MG/1; MG/1; MG/1
1 TABLET ORAL DAILY
COMMUNITY

## 2022-05-29 RX ORDER — MAGNESIUM CARB/ALUMINUM HYDROX 105-160MG
30 TABLET,CHEWABLE ORAL ONCE
Status: DISCONTINUED | OUTPATIENT
Start: 2022-05-29 | End: 2022-05-30 | Stop reason: HOSPADM

## 2022-05-29 RX ORDER — ONDANSETRON 4 MG/1
4 TABLET, FILM COATED ORAL EVERY 6 HOURS PRN
Status: DISCONTINUED | OUTPATIENT
Start: 2022-05-29 | End: 2022-05-30 | Stop reason: HOSPADM

## 2022-05-29 RX ORDER — LIDOCAINE HYDROCHLORIDE 10 MG/ML
5 INJECTION, SOLUTION EPIDURAL; INFILTRATION; INTRACAUDAL; PERINEURAL AS NEEDED
Status: DISCONTINUED | OUTPATIENT
Start: 2022-05-29 | End: 2022-05-30 | Stop reason: HOSPADM

## 2022-05-29 RX ORDER — SODIUM CHLORIDE 0.9 % (FLUSH) 0.9 %
10 SYRINGE (ML) INJECTION AS NEEDED
Status: DISCONTINUED | OUTPATIENT
Start: 2022-05-29 | End: 2022-05-30 | Stop reason: HOSPADM

## 2022-05-29 RX ORDER — OXYTOCIN/0.9 % SODIUM CHLORIDE 30/500 ML
PLASTIC BAG, INJECTION (ML) INTRAVENOUS
Status: DISCONTINUED
Start: 2022-05-29 | End: 2022-06-01 | Stop reason: HOSPADM

## 2022-05-29 RX ORDER — ACETAMINOPHEN 325 MG/1
650 TABLET ORAL EVERY 4 HOURS PRN
Status: DISCONTINUED | OUTPATIENT
Start: 2022-05-29 | End: 2022-05-30 | Stop reason: HOSPADM

## 2022-05-29 RX ORDER — EPHEDRINE SULFATE 5 MG/ML
INJECTION INTRAVENOUS
Status: DISCONTINUED
Start: 2022-05-29 | End: 2022-06-01 | Stop reason: HOSPADM

## 2022-05-29 RX ORDER — SODIUM CHLORIDE, SODIUM LACTATE, POTASSIUM CHLORIDE, CALCIUM CHLORIDE 600; 310; 30; 20 MG/100ML; MG/100ML; MG/100ML; MG/100ML
125 INJECTION, SOLUTION INTRAVENOUS CONTINUOUS
Status: DISCONTINUED | OUTPATIENT
Start: 2022-05-29 | End: 2022-05-30

## 2022-05-29 RX ORDER — SODIUM CHLORIDE 0.9 % (FLUSH) 0.9 %
10 SYRINGE (ML) INJECTION EVERY 12 HOURS SCHEDULED
Status: DISCONTINUED | OUTPATIENT
Start: 2022-05-29 | End: 2022-05-30 | Stop reason: HOSPADM

## 2022-05-29 RX ORDER — PROMETHAZINE HYDROCHLORIDE 12.5 MG/1
12.5 TABLET ORAL EVERY 6 HOURS PRN
Status: DISCONTINUED | OUTPATIENT
Start: 2022-05-29 | End: 2022-05-30 | Stop reason: HOSPADM

## 2022-05-29 RX ORDER — ONDANSETRON 2 MG/ML
4 INJECTION INTRAMUSCULAR; INTRAVENOUS EVERY 6 HOURS PRN
Status: DISCONTINUED | OUTPATIENT
Start: 2022-05-29 | End: 2022-05-30 | Stop reason: HOSPADM

## 2022-05-29 RX ADMIN — SODIUM CHLORIDE, POTASSIUM CHLORIDE, SODIUM LACTATE AND CALCIUM CHLORIDE 125 ML/HR: 600; 310; 30; 20 INJECTION, SOLUTION INTRAVENOUS at 21:20

## 2022-05-29 RX ADMIN — PENICILLIN G 3 MILLION UNITS: 3000000 INJECTION, SOLUTION INTRAVENOUS at 22:06

## 2022-05-29 RX ADMIN — SODIUM CHLORIDE 5 MILLION UNITS: 900 INJECTION INTRAVENOUS at 18:54

## 2022-05-29 RX ADMIN — SODIUM CHLORIDE, POTASSIUM CHLORIDE, SODIUM LACTATE AND CALCIUM CHLORIDE 125 ML/HR: 600; 310; 30; 20 INJECTION, SOLUTION INTRAVENOUS at 16:30

## 2022-05-30 ENCOUNTER — ANESTHESIA (OUTPATIENT)
Dept: LABOR AND DELIVERY | Facility: HOSPITAL | Age: 27
End: 2022-05-30

## 2022-05-30 ENCOUNTER — ANESTHESIA EVENT (OUTPATIENT)
Dept: LABOR AND DELIVERY | Facility: HOSPITAL | Age: 27
End: 2022-05-30

## 2022-05-30 PROBLEM — Z37.9 VACUUM-ASSISTED VAGINAL DELIVERY: Status: ACTIVE | Noted: 2022-05-30

## 2022-05-30 PROCEDURE — 25010000002 METHYLERGONOVINE MALEATE PER 0.2 MG

## 2022-05-30 PROCEDURE — 0KQM0ZZ REPAIR PERINEUM MUSCLE, OPEN APPROACH: ICD-10-PCS | Performed by: OBSTETRICS & GYNECOLOGY

## 2022-05-30 PROCEDURE — C1755 CATHETER, INTRASPINAL: HCPCS

## 2022-05-30 PROCEDURE — 25010000002 PENICILLIN G POTASSIUM PER 600000 UNITS: Performed by: NURSE PRACTITIONER

## 2022-05-30 PROCEDURE — C1755 CATHETER, INTRASPINAL: HCPCS | Performed by: ANESTHESIOLOGY

## 2022-05-30 PROCEDURE — 51701 INSERT BLADDER CATHETER: CPT

## 2022-05-30 PROCEDURE — 25010000002 ROPIVACAINE PER 1 MG: Performed by: ANESTHESIOLOGY

## 2022-05-30 PROCEDURE — 0UQMXZZ REPAIR VULVA, EXTERNAL APPROACH: ICD-10-PCS | Performed by: OBSTETRICS & GYNECOLOGY

## 2022-05-30 PROCEDURE — 51703 INSERT BLADDER CATH COMPLEX: CPT

## 2022-05-30 PROCEDURE — 25010000002 FENTANYL CITRATE (PF) 50 MCG/ML SOLUTION: Performed by: ANESTHESIOLOGY

## 2022-05-30 RX ORDER — HYDROCODONE BITARTRATE AND ACETAMINOPHEN 5; 325 MG/1; MG/1
1 TABLET ORAL EVERY 4 HOURS PRN
Status: DISCONTINUED | OUTPATIENT
Start: 2022-05-30 | End: 2022-06-01 | Stop reason: HOSPADM

## 2022-05-30 RX ORDER — OXYTOCIN/0.9 % SODIUM CHLORIDE 30/500 ML
PLASTIC BAG, INJECTION (ML) INTRAVENOUS
Status: DISCONTINUED
Start: 2022-05-30 | End: 2022-06-01 | Stop reason: HOSPADM

## 2022-05-30 RX ORDER — DOCUSATE SODIUM 100 MG/1
100 CAPSULE, LIQUID FILLED ORAL 2 TIMES DAILY
Status: DISCONTINUED | OUTPATIENT
Start: 2022-05-30 | End: 2022-06-01 | Stop reason: HOSPADM

## 2022-05-30 RX ORDER — TRISODIUM CITRATE DIHYDRATE AND CITRIC ACID MONOHYDRATE 500; 334 MG/5ML; MG/5ML
30 SOLUTION ORAL ONCE
Status: DISCONTINUED | OUTPATIENT
Start: 2022-05-30 | End: 2022-05-30 | Stop reason: HOSPADM

## 2022-05-30 RX ORDER — ONDANSETRON 2 MG/ML
4 INJECTION INTRAMUSCULAR; INTRAVENOUS EVERY 6 HOURS PRN
Status: DISCONTINUED | OUTPATIENT
Start: 2022-05-30 | End: 2022-06-01 | Stop reason: HOSPADM

## 2022-05-30 RX ORDER — ACETAMINOPHEN 325 MG/1
650 TABLET ORAL EVERY 4 HOURS PRN
Status: DISCONTINUED | OUTPATIENT
Start: 2022-05-30 | End: 2022-05-30 | Stop reason: HOSPADM

## 2022-05-30 RX ORDER — METHYLERGONOVINE MALEATE 0.2 MG/ML
INJECTION INTRAVENOUS
Status: COMPLETED
Start: 2022-05-30 | End: 2022-05-30

## 2022-05-30 RX ORDER — IBUPROFEN 600 MG/1
600 TABLET ORAL EVERY 6 HOURS PRN
Status: DISCONTINUED | OUTPATIENT
Start: 2022-05-30 | End: 2022-06-01 | Stop reason: HOSPADM

## 2022-05-30 RX ORDER — LIDOCAINE HYDROCHLORIDE AND EPINEPHRINE 15; 5 MG/ML; UG/ML
INJECTION, SOLUTION EPIDURAL AS NEEDED
Status: DISCONTINUED | OUTPATIENT
Start: 2022-05-30 | End: 2022-05-30 | Stop reason: SURG

## 2022-05-30 RX ORDER — OXYTOCIN/0.9 % SODIUM CHLORIDE 30/500 ML
85 PLASTIC BAG, INJECTION (ML) INTRAVENOUS ONCE
Status: COMPLETED | OUTPATIENT
Start: 2022-05-30 | End: 2022-05-30

## 2022-05-30 RX ORDER — ROPIVACAINE HYDROCHLORIDE 5 MG/ML
INJECTION, SOLUTION EPIDURAL; INFILTRATION; PERINEURAL AS NEEDED
Status: DISCONTINUED | OUTPATIENT
Start: 2022-05-30 | End: 2022-05-30 | Stop reason: SURG

## 2022-05-30 RX ORDER — PROMETHAZINE HYDROCHLORIDE 12.5 MG/1
12.5 TABLET ORAL EVERY 6 HOURS PRN
Status: DISCONTINUED | OUTPATIENT
Start: 2022-05-30 | End: 2022-05-30 | Stop reason: HOSPADM

## 2022-05-30 RX ORDER — ROPIVACAINE HYDROCHLORIDE 2 MG/ML
15 INJECTION, SOLUTION EPIDURAL; INFILTRATION; PERINEURAL CONTINUOUS
Status: DISCONTINUED | OUTPATIENT
Start: 2022-05-30 | End: 2022-05-30

## 2022-05-30 RX ORDER — METOCLOPRAMIDE HYDROCHLORIDE 5 MG/ML
10 INJECTION INTRAMUSCULAR; INTRAVENOUS ONCE AS NEEDED
Status: DISCONTINUED | OUTPATIENT
Start: 2022-05-30 | End: 2022-05-30 | Stop reason: HOSPADM

## 2022-05-30 RX ORDER — BISACODYL 10 MG
10 SUPPOSITORY, RECTAL RECTAL DAILY PRN
Status: DISCONTINUED | OUTPATIENT
Start: 2022-05-31 | End: 2022-06-01 | Stop reason: HOSPADM

## 2022-05-30 RX ORDER — PROMETHAZINE HYDROCHLORIDE 12.5 MG/1
12.5 SUPPOSITORY RECTAL EVERY 6 HOURS PRN
Status: DISCONTINUED | OUTPATIENT
Start: 2022-05-30 | End: 2022-05-30 | Stop reason: HOSPADM

## 2022-05-30 RX ORDER — MORPHINE SULFATE 2 MG/ML
2 INJECTION, SOLUTION INTRAMUSCULAR; INTRAVENOUS
Status: DISCONTINUED | OUTPATIENT
Start: 2022-05-30 | End: 2022-05-30 | Stop reason: HOSPADM

## 2022-05-30 RX ORDER — LIDOCAINE HYDROCHLORIDE AND EPINEPHRINE 20; 5 MG/ML; UG/ML
INJECTION, SOLUTION EPIDURAL; INFILTRATION; INTRACAUDAL; PERINEURAL AS NEEDED
Status: DISCONTINUED | OUTPATIENT
Start: 2022-05-30 | End: 2022-05-30 | Stop reason: SURG

## 2022-05-30 RX ORDER — FENTANYL CITRATE 50 UG/ML
INJECTION, SOLUTION INTRAMUSCULAR; INTRAVENOUS AS NEEDED
Status: DISCONTINUED | OUTPATIENT
Start: 2022-05-30 | End: 2022-05-30 | Stop reason: SURG

## 2022-05-30 RX ORDER — IBUPROFEN 600 MG/1
600 TABLET ORAL EVERY 6 HOURS PRN
Status: DISCONTINUED | OUTPATIENT
Start: 2022-05-30 | End: 2022-05-30 | Stop reason: HOSPADM

## 2022-05-30 RX ORDER — ONDANSETRON 2 MG/ML
4 INJECTION INTRAMUSCULAR; INTRAVENOUS ONCE AS NEEDED
Status: DISCONTINUED | OUTPATIENT
Start: 2022-05-30 | End: 2022-05-30 | Stop reason: HOSPADM

## 2022-05-30 RX ORDER — HYDROCORTISONE 25 MG/G
1 CREAM TOPICAL AS NEEDED
Status: DISCONTINUED | OUTPATIENT
Start: 2022-05-30 | End: 2022-06-01 | Stop reason: HOSPADM

## 2022-05-30 RX ORDER — FAMOTIDINE 10 MG/ML
20 INJECTION, SOLUTION INTRAVENOUS ONCE AS NEEDED
Status: DISCONTINUED | OUTPATIENT
Start: 2022-05-30 | End: 2022-05-30 | Stop reason: HOSPADM

## 2022-05-30 RX ORDER — TERBUTALINE SULFATE 1 MG/ML
INJECTION, SOLUTION SUBCUTANEOUS
Status: DISCONTINUED
Start: 2022-05-30 | End: 2022-06-01 | Stop reason: HOSPADM

## 2022-05-30 RX ORDER — SODIUM CHLORIDE 0.9 % (FLUSH) 0.9 %
1-10 SYRINGE (ML) INJECTION AS NEEDED
Status: DISCONTINUED | OUTPATIENT
Start: 2022-05-30 | End: 2022-06-01 | Stop reason: HOSPADM

## 2022-05-30 RX ORDER — OXYTOCIN/0.9 % SODIUM CHLORIDE 30/500 ML
650 PLASTIC BAG, INJECTION (ML) INTRAVENOUS ONCE
Status: COMPLETED | OUTPATIENT
Start: 2022-05-30 | End: 2022-05-30

## 2022-05-30 RX ORDER — DIPHENHYDRAMINE HYDROCHLORIDE 50 MG/ML
12.5 INJECTION INTRAMUSCULAR; INTRAVENOUS EVERY 8 HOURS PRN
Status: DISCONTINUED | OUTPATIENT
Start: 2022-05-30 | End: 2022-05-30 | Stop reason: HOSPADM

## 2022-05-30 RX ORDER — EPHEDRINE SULFATE 5 MG/ML
10 INJECTION INTRAVENOUS
Status: DISCONTINUED | OUTPATIENT
Start: 2022-05-30 | End: 2022-05-30 | Stop reason: HOSPADM

## 2022-05-30 RX ADMIN — WITCH HAZEL 1 PAD: 500 SOLUTION RECTAL; TOPICAL at 06:09

## 2022-05-30 RX ADMIN — METHYLERGONOVINE MALEATE 200 MCG: 0.2 INJECTION, SOLUTION INTRAMUSCULAR; INTRAVENOUS at 04:57

## 2022-05-30 RX ADMIN — Medication: at 06:09

## 2022-05-30 RX ADMIN — IBUPROFEN 600 MG: 600 TABLET ORAL at 13:36

## 2022-05-30 RX ADMIN — FENTANYL CITRATE 100 MCG: 50 INJECTION, SOLUTION INTRAMUSCULAR; INTRAVENOUS at 00:55

## 2022-05-30 RX ADMIN — DOCUSATE SODIUM 100 MG: 100 CAPSULE, LIQUID FILLED ORAL at 20:15

## 2022-05-30 RX ADMIN — EPHEDRINE SULFATE 10 MG: 5 INJECTION INTRAVENOUS at 01:30

## 2022-05-30 RX ADMIN — SODIUM CHLORIDE, POTASSIUM CHLORIDE, SODIUM LACTATE AND CALCIUM CHLORIDE 999 ML/HR: 600; 310; 30; 20 INJECTION, SOLUTION INTRAVENOUS at 02:10

## 2022-05-30 RX ADMIN — LIDOCAINE HYDROCHLORIDE AND EPINEPHRINE 2 ML: 15; 5 INJECTION, SOLUTION EPIDURAL at 00:53

## 2022-05-30 RX ADMIN — IBUPROFEN 600 MG: 600 TABLET ORAL at 07:24

## 2022-05-30 RX ADMIN — DOCUSATE SODIUM 100 MG: 100 CAPSULE, LIQUID FILLED ORAL at 07:24

## 2022-05-30 RX ADMIN — Medication 85 ML/HR: at 04:19

## 2022-05-30 RX ADMIN — Medication 1 APPLICATION: at 06:08

## 2022-05-30 RX ADMIN — Medication 650 ML/HR: at 03:36

## 2022-05-30 RX ADMIN — ROPIVACAINE HYDROCHLORIDE 6 ML: 5 INJECTION, SOLUTION EPIDURAL; INFILTRATION; PERINEURAL at 00:55

## 2022-05-30 RX ADMIN — LIDOCAINE HYDROCHLORIDE,EPINEPHRINE BITARTRATE 5 ML: 20; .005 INJECTION, SOLUTION EPIDURAL; INFILTRATION; INTRACAUDAL; PERINEURAL at 03:40

## 2022-05-30 RX ADMIN — PENICILLIN G 3 MILLION UNITS: 3000000 INJECTION, SOLUTION INTRAVENOUS at 02:18

## 2022-05-30 RX ADMIN — LIDOCAINE HYDROCHLORIDE AND EPINEPHRINE 3 ML: 15; 5 INJECTION, SOLUTION EPIDURAL at 00:51

## 2022-05-30 RX ADMIN — ROPIVACAINE HYDROCHLORIDE 15 ML/HR: 2 INJECTION, SOLUTION EPIDURAL; INFILTRATION at 00:58

## 2022-05-30 NOTE — ANESTHESIA PROCEDURE NOTES
Labor Epidural      Patient reassessed immediately prior to procedure    Patient location during procedure: OB  Performed By  Anesthesiologist: Nathaly Rodriguez DO  Preanesthetic Checklist  Completed: patient identified, IV checked, risks and benefits discussed, surgical consent, monitors and equipment checked, pre-op evaluation and timeout performed  Additional Notes  CSE performed using 25g German  Prep:  Pt Position:sitting  Sterile Tech:cap, gloves, mask and sterile barrier  Prep:chlorhexidine gluconate and isopropyl alcohol  Monitoring:blood pressure monitoring  Epidural Block Procedure:  Approach:midline  Guidance:palpation technique  Location:L3-L4  Needle Type:Tuohy  Needle Gauge:17 G  Loss of Resistance Medium: air  Loss of Resistance: 5cm  Cath Depth at skin:12 cm  Paresthesia: none  Aspiration:negative  Test Dose:negative  Number of Attempts: 1  Post Assessment:  Dressing:occlusive dressing applied and secured with tape  Pt Tolerance:patient tolerated the procedure well with no apparent complications  Complications:no

## 2022-05-30 NOTE — ANESTHESIA PREPROCEDURE EVALUATION
Anesthesia Evaluation     Patient summary reviewed and Nursing notes reviewed   NPO Solid Status: > 6 hours             Airway   Mallampati: II  TM distance: >3 FB  Neck ROM: full  No difficulty expected  Dental      Pulmonary - negative pulmonary ROS   Cardiovascular - negative cardio ROS        Neuro/Psych- negative ROS  GI/Hepatic/Renal/Endo - negative ROS     Musculoskeletal (-) negative ROS    Abdominal    Substance History - negative use     OB/GYN    (+) Pregnant,         Other - negative ROS       ROS/Med Hx Other: scoliosis                Anesthesia Plan    ASA 2     epidural       Anesthetic plan, all risks, benefits, and alternatives have been provided, discussed and informed consent has been obtained with: patient.        CODE STATUS:

## 2022-05-31 LAB
BASOPHILS # BLD AUTO: 0.05 10*3/MM3 (ref 0–0.2)
BASOPHILS NFR BLD AUTO: 0.4 % (ref 0–1.5)
DEPRECATED RDW RBC AUTO: 41.9 FL (ref 37–54)
EOSINOPHIL # BLD AUTO: 0.13 10*3/MM3 (ref 0–0.4)
EOSINOPHIL NFR BLD AUTO: 1 % (ref 0.3–6.2)
ERYTHROCYTE [DISTWIDTH] IN BLOOD BY AUTOMATED COUNT: 13.7 % (ref 12.3–15.4)
HCT VFR BLD AUTO: 26.3 % (ref 34–46.6)
HGB BLD-MCNC: 8.4 G/DL (ref 12–15.9)
IMM GRANULOCYTES # BLD AUTO: 0.07 10*3/MM3 (ref 0–0.05)
IMM GRANULOCYTES NFR BLD AUTO: 0.6 % (ref 0–0.5)
LYMPHOCYTES # BLD AUTO: 2.54 10*3/MM3 (ref 0.7–3.1)
LYMPHOCYTES NFR BLD AUTO: 20.3 % (ref 19.6–45.3)
MCH RBC QN AUTO: 27.1 PG (ref 26.6–33)
MCHC RBC AUTO-ENTMCNC: 31.9 G/DL (ref 31.5–35.7)
MCV RBC AUTO: 84.8 FL (ref 79–97)
MONOCYTES # BLD AUTO: 0.77 10*3/MM3 (ref 0.1–0.9)
MONOCYTES NFR BLD AUTO: 6.2 % (ref 5–12)
NEUTROPHILS NFR BLD AUTO: 71.5 % (ref 42.7–76)
NEUTROPHILS NFR BLD AUTO: 8.94 10*3/MM3 (ref 1.7–7)
NRBC BLD AUTO-RTO: 0 /100 WBC (ref 0–0.2)
PLATELET # BLD AUTO: 211 10*3/MM3 (ref 140–450)
PMV BLD AUTO: 11.4 FL (ref 6–12)
RBC # BLD AUTO: 3.1 10*6/MM3 (ref 3.77–5.28)
WBC NRBC COR # BLD: 12.5 10*3/MM3 (ref 3.4–10.8)

## 2022-05-31 PROCEDURE — 85025 COMPLETE CBC W/AUTO DIFF WBC: CPT | Performed by: NURSE PRACTITIONER

## 2022-05-31 RX ORDER — FERROUS SULFATE 325(65) MG
325 TABLET ORAL 2 TIMES DAILY WITH MEALS
Status: DISCONTINUED | OUTPATIENT
Start: 2022-05-31 | End: 2022-06-01 | Stop reason: HOSPADM

## 2022-05-31 RX ADMIN — IBUPROFEN 600 MG: 600 TABLET ORAL at 01:42

## 2022-05-31 RX ADMIN — DOCUSATE SODIUM 100 MG: 100 CAPSULE, LIQUID FILLED ORAL at 21:12

## 2022-05-31 RX ADMIN — FERROUS SULFATE TAB 325 MG (65 MG ELEMENTAL FE) 325 MG: 325 (65 FE) TAB at 16:12

## 2022-05-31 RX ADMIN — DOCUSATE SODIUM 100 MG: 100 CAPSULE, LIQUID FILLED ORAL at 08:28

## 2022-05-31 RX ADMIN — IBUPROFEN 600 MG: 600 TABLET ORAL at 08:28

## 2022-05-31 RX ADMIN — IBUPROFEN 600 MG: 600 TABLET ORAL at 16:12

## 2022-05-31 RX ADMIN — FERROUS SULFATE TAB 325 MG (65 MG ELEMENTAL FE) 325 MG: 325 (65 FE) TAB at 09:00

## 2022-06-01 VITALS
HEART RATE: 89 BPM | RESPIRATION RATE: 18 BRPM | TEMPERATURE: 98.4 F | SYSTOLIC BLOOD PRESSURE: 118 MMHG | WEIGHT: 167 LBS | DIASTOLIC BLOOD PRESSURE: 61 MMHG | HEIGHT: 64 IN | BODY MASS INDEX: 28.51 KG/M2

## 2022-06-01 PROBLEM — Z37.9 NORMAL LABOR: Status: RESOLVED | Noted: 2022-05-29 | Resolved: 2022-06-01

## 2022-06-01 RX ORDER — FERROUS SULFATE 325(65) MG
325 TABLET ORAL 2 TIMES DAILY WITH MEALS
Qty: 60 TABLET | Refills: 1 | Status: SHIPPED | OUTPATIENT
Start: 2022-06-01 | End: 2022-09-19

## 2022-06-01 RX ORDER — DOCUSATE SODIUM 100 MG/1
100 CAPSULE, LIQUID FILLED ORAL 2 TIMES DAILY PRN
Qty: 60 CAPSULE | Refills: 1 | Status: SHIPPED | OUTPATIENT
Start: 2022-06-01 | End: 2022-09-19

## 2022-06-01 RX ORDER — IBUPROFEN 600 MG/1
600 TABLET ORAL EVERY 6 HOURS PRN
Qty: 60 TABLET | Refills: 1 | Status: SHIPPED | OUTPATIENT
Start: 2022-06-01 | End: 2022-09-19

## 2022-06-01 RX ADMIN — FERROUS SULFATE TAB 325 MG (65 MG ELEMENTAL FE) 325 MG: 325 (65 FE) TAB at 08:19

## 2022-06-01 RX ADMIN — IBUPROFEN 600 MG: 600 TABLET ORAL at 01:39

## 2022-06-01 RX ADMIN — DOCUSATE SODIUM 100 MG: 100 CAPSULE, LIQUID FILLED ORAL at 08:19

## 2022-06-01 NOTE — PLAN OF CARE
Goal Outcome Evaluation:  Plan of Care Reviewed With: patient        Progress: improving  Outcome Evaluation: VSS; up ad juan; laceration, using products; fundus and lochia WNL; passing flatus; bowel  movement on 6/1; pain controlled with Motrin; breastfeeding; awaiting discharge today.

## 2022-06-01 NOTE — DISCHARGE SUMMARY
[facility]  Vaginal delivery discharge summary      Patient: Dacia Cui      MR#:[id number]  Admission  Diagnosis:   Patient Active Problem List   Diagnosis   • Vacuum-assisted vaginal delivery     Discharge Diagnosis: Vacuum-assisted vaginal delivery.      Date of Admission: 5/29/2022  Date of Discharge:  6/1/2022    Procedures:  Vaginal, Vacuum (Extractor)     5/30/2022    3:31 AM      Service:  Obstetrics    Hospital Course:  Patient underwent vaginal delivery and remained in the hospital for 3 days.  During that time she remained afebrile and hemodynamically stable.  On the day of discharge, she was eating, ambulating and voiding without difficulty.  She is breastfeeding.     Labs:    Lab Results   Component Value Date    WBC 12.50 (H) 05/31/2022    HGB 8.4 (L) 05/31/2022    HCT 26.3 (L) 05/31/2022    MCV 84.8 05/31/2022     05/31/2022    AST 32 06/08/2018    ALT 39 06/08/2018     Results from last 7 days   Lab Units 05/29/22  1632   ABO TYPING  A   RH TYPING  Positive   ANTIBODY SCREEN  Negative       Discharge Medications     Discharge Medications      New Medications      Instructions Start Date   docusate sodium 100 MG capsule  Commonly known as: COLACE   100 mg, Oral, 2 Times Daily PRN      ferrous sulfate 325 (65 FE) MG tablet   325 mg, Oral, 2 Times Daily With Meals      ibuprofen 600 MG tablet  Commonly known as: ADVIL,MOTRIN   600 mg, Oral, Every 6 Hours PRN         Continue These Medications      Instructions Start Date   fluticasone 50 MCG/ACT nasal spray  Commonly known as: FLONASE   2 sprays, Nasal, As Needed      Prenatal 27-1 27-1 MG tablet tablet   1 tablet, Oral, Daily         Stop These Medications    ondansetron ODT 4 MG disintegrating tablet  Commonly known as: ZOFRAN-ODT            Discharge Disposition:  To Home    Discharge Condition:  Stable. PP anemia taking ferrous sulfate.     Discharge Diet: Regular    Activity at Discharge: Pelvic rest    Follow-up  Appointments  Follow up with [facility] in 6 weeks.     Rohith Perera CNM  06/01/22  11:22 EDT

## 2022-06-01 NOTE — PROGRESS NOTES
6/1/2022  PPD #2    Subjective   Dacia feels well.  Patient describes her lochia as less than menses.  Pain is well controlled  She is breastfeeding.      Objective   Temp: Temp:  [97.7 °F (36.5 °C)-98.4 °F (36.9 °C)] 98.4 °F (36.9 °C) Temp src: Oral   BP: BP: (118-123)/(61-71) 118/61        Pulse: Heart Rate:  [67-89] 89  RR: Resp:  [16-18] 18    General:  No acute distress   Abdomen: Fundus firm and beneath umbilicus   Pelvis: deferred     Lab Results   Component Value Date    WBC 12.50 (H) 05/31/2022    HGB 8.4 (L) 05/31/2022    HCT 26.3 (L) 05/31/2022    MCV 84.8 05/31/2022     05/31/2022    HEPBSAG Non-Reactive 05/15/2018    AST 32 06/08/2018       Assessment  1. PPD# 2 after vaginal delivery  2.   PP anemia 2/2 acute blood loss    Plan  1. Discharge to home  2. Follow up with [facility]  in 6 weeks  3. Prescriptions for Motrin, ferrous sulfate, and Colace prescribed and advised on weaning.  4. Advised no tampons, intercourse, or tub baths for 6 weeks.       This note has been electronically signed.    Rohith Perera CNM  11:22 EDT  June 1, 2022

## 2022-09-19 ENCOUNTER — OFFICE VISIT (OUTPATIENT)
Dept: FAMILY MEDICINE CLINIC | Facility: CLINIC | Age: 27
End: 2022-09-19

## 2022-09-19 VITALS
OXYGEN SATURATION: 99 % | HEIGHT: 64 IN | DIASTOLIC BLOOD PRESSURE: 82 MMHG | BODY MASS INDEX: 23.39 KG/M2 | SYSTOLIC BLOOD PRESSURE: 130 MMHG | HEART RATE: 91 BPM | WEIGHT: 137 LBS

## 2022-09-19 DIAGNOSIS — R63.4 WEIGHT LOSS: Primary | ICD-10-CM

## 2022-09-19 PROCEDURE — 36415 COLL VENOUS BLD VENIPUNCTURE: CPT | Performed by: PHYSICIAN ASSISTANT

## 2022-09-19 PROCEDURE — 99203 OFFICE O/P NEW LOW 30 MIN: CPT | Performed by: PHYSICIAN ASSISTANT

## 2022-09-19 RX ORDER — DROSPIRENONE AND ETHINYL ESTRADIOL 0.03MG-3MG
1 KIT ORAL DAILY
COMMUNITY

## 2022-09-19 NOTE — ASSESSMENT & PLAN NOTE
We will get blood work today.  However advised patient weight loss is likely secondary to her low calorie intake and the baby's feeding schedule.  Advised patient to increase protein via protein powder and protein drinks.  Also advised patient if blood work is normal and she would like further follow-up I can provide a referral to a nutritionist acknowledged understanding

## 2022-09-19 NOTE — PROGRESS NOTES
"Chief Complaint  Establish Care and Weight Loss (Patient reports that she has lost a lot of weight and it has her concerned )    Subjective    {Problem List  Visit Diagnosis   Encounters  Notes  Medications  Labs  Result Review Imaging  Media :23}    Dacia Cui presents to [facility]  History of Present Illness  Patient reports today to establish care.  Patient reports giving birth to her daughter in May states she weighed 167 pounds at that time.  Patient reports she has been breast-feeding daily and noticed that she is lost 30 pounds.  Patient reports discussing the weight loss with her obstetrician and they advised her to follow-up with PCP.  Patient reports she has been tracking her calories and has been taking 1200 joann a day.  Patient reports eating meat and fruit most of the time.  Patient reports she has discontinued working out however would like to restart.  Weight Loss        Objective   Vital Signs:  /82 (BP Location: Right arm, Patient Position: Sitting, Cuff Size: Adult)   Pulse 91   Ht 162.6 cm (64\")   Wt 62.1 kg (137 lb)   SpO2 99%   BMI 23.52 kg/m²   Estimated body mass index is 23.52 kg/m² as calculated from the following:    Height as of this encounter: 162.6 cm (64\").    Weight as of this encounter: 62.1 kg (137 lb).    BMI is within normal parameters. No other follow-up for BMI required.      Physical Exam  Vitals and nursing note reviewed.   Constitutional:       General: She is not in acute distress.     Appearance: Normal appearance.   HENT:      Head: Normocephalic.      Right Ear: Hearing normal.      Left Ear: Hearing normal.   Eyes:      Pupils: Pupils are equal, round, and reactive to light.   Cardiovascular:      Rate and Rhythm: Normal rate and regular rhythm.   Pulmonary:      Effort: Pulmonary effort is normal. No respiratory distress.   Skin:     General: Skin is warm and dry.   Neurological:      Mental Status: She is alert. "   Psychiatric:         Mood and Affect: Mood normal.        Result Review :                Assessment and Plan   Diagnoses and all orders for this visit:    1. Weight loss (Primary)  Assessment & Plan:  We will get blood work today.  However advised patient weight loss is likely secondary to her low calorie intake and the baby's feeding schedule.  Advised patient to increase protein via protein powder and protein drinks.  Also advised patient if blood work is normal and she would like further follow-up I can provide a referral to a nutritionist acknowledged understanding    Orders:  -     TSH; Future  -     Comprehensive Metabolic Panel; Future    2. Patient is a currently breast-feeding mother  Assessment & Plan:  See plan above               Follow Up   No follow-ups on file.  Patient was given instructions and counseling regarding her condition or for health maintenance advice. Please see specific information pulled into the AVS if appropriate.

## 2022-09-20 LAB
ALBUMIN SERPL-MCNC: 4.8 G/DL (ref 3.9–5)
ALBUMIN/GLOB SERPL: 2 {RATIO} (ref 1.2–2.2)
ALP SERPL-CCNC: 59 IU/L (ref 44–121)
ALT SERPL-CCNC: 17 IU/L (ref 0–32)
AST SERPL-CCNC: 16 IU/L (ref 0–40)
BILIRUB SERPL-MCNC: 0.4 MG/DL (ref 0–1.2)
BUN SERPL-MCNC: 18 MG/DL (ref 6–20)
BUN/CREAT SERPL: 20 (ref 9–23)
CALCIUM SERPL-MCNC: 9.4 MG/DL (ref 8.7–10.2)
CHLORIDE SERPL-SCNC: 103 MMOL/L (ref 96–106)
CO2 SERPL-SCNC: 18 MMOL/L (ref 20–29)
CREAT SERPL-MCNC: 0.88 MG/DL (ref 0.57–1)
EGFRCR SERPLBLD CKD-EPI 2021: 93 ML/MIN/1.73
GLOBULIN SER CALC-MCNC: 2.4 G/DL (ref 1.5–4.5)
GLUCOSE SERPL-MCNC: 84 MG/DL (ref 65–99)
POTASSIUM SERPL-SCNC: 4.4 MMOL/L (ref 3.5–5.2)
PROT SERPL-MCNC: 7.2 G/DL (ref 6–8.5)
SODIUM SERPL-SCNC: 139 MMOL/L (ref 134–144)
TSH SERPL DL<=0.005 MIU/L-ACNC: 1.95 UIU/ML (ref 0.45–4.5)

## 2023-11-22 ENCOUNTER — OFFICE VISIT (OUTPATIENT)
Dept: FAMILY MEDICINE CLINIC | Facility: CLINIC | Age: 28
End: 2023-11-22
Payer: COMMERCIAL

## 2023-11-22 ENCOUNTER — PATIENT ROUNDING (BHMG ONLY) (OUTPATIENT)
Dept: FAMILY MEDICINE CLINIC | Facility: CLINIC | Age: 28
End: 2023-11-22
Payer: COMMERCIAL

## 2023-11-22 VITALS
HEIGHT: 64 IN | OXYGEN SATURATION: 98 % | WEIGHT: 133.4 LBS | BODY MASS INDEX: 22.77 KG/M2 | TEMPERATURE: 97.5 F | RESPIRATION RATE: 12 BRPM | SYSTOLIC BLOOD PRESSURE: 100 MMHG | DIASTOLIC BLOOD PRESSURE: 70 MMHG | HEART RATE: 77 BPM

## 2023-11-22 DIAGNOSIS — Z00.00 WELL ADULT EXAM: Primary | ICD-10-CM

## 2023-11-22 DIAGNOSIS — F32.A ANXIETY AND DEPRESSION: ICD-10-CM

## 2023-11-22 DIAGNOSIS — F41.9 ANXIETY AND DEPRESSION: ICD-10-CM

## 2023-11-22 NOTE — PROGRESS NOTES
November 22, 2023    Hello, may I speak with Dacia Cui?    My name is NATALYA BUNDY       I am  with [facility]  [address] [id number] [phone].    Before we get started may I verify your date of birth? 1995    I am calling to officially welcome you to our practice and ask about your recent visit. Is this a good time to talk? YES      Tell me about your visit with us. What things went well?  I JUST LOVE NEELIMA. SHE IS GREAT. SO NICE AND KIND       We're always looking for ways to make our patients' experiences even better. Do you have recommendations on ways we may improve?  no. EVERYTHING IS GREAT .  GREAT OFFICE ATMOSPHERE.     Overall were you satisfied with your first visit to our practice? yes       I appreciate you taking the time to speak with me today. Is there anything else I can do for you? no      Thank you, and have a great day.

## 2023-11-22 NOTE — ASSESSMENT & PLAN NOTE
Patient presents today for annual wellness exam  Patient chief complaint anxiety with some depression  Patient educational materials regarding routine health maintenance and nutrition attached to patient MyChart and reviewed with patient during office visit  Patient agrees to baseline laboratory testing today and follow-up visit to discuss health goals and close additional care gaps

## 2023-11-22 NOTE — ASSESSMENT & PLAN NOTE
"Anxiety: Patient complains of anxiety mainly with some depression.  She has the following symptoms: difficulty concentrating, feelings of losing control, irritable, racing thoughts. Onset of symptoms was many years ago with patient remembering having some symptoms of anxiety as a child, with symptoms \"coming and going\" in adulthood.  Intermittent and situational since that time and increased with her pregnancy. She denies current suicidal and homicidal ideation. Family history significant for no psychiatric illness.Possible organic causes contributing are: endocrine/metabolic. Risk factors: none Previous treatment includes nonpharmacological management  Patient agrees to baseline laboratory studies today  Patient is not necessarily interested in pharmacological at this time but is open to this in the future-patient currently breast-feeding  Patient educational materials regarding coping strategies for anxiety were attached to patient's MyChart and reviewed in her appointment today  Patient agrees to follow-up in a couple of weeks to discuss treatment plan going forward    "

## 2023-11-22 NOTE — PATIENT INSTRUCTIONS
Health Maintenance, Female  Adopting a healthy lifestyle and getting preventive care can go a long way to promote health and wellness. Talk with your health care provider about what schedule of regular examinations is right for you. This is a good chance for you to check in with your provider about disease prevention and staying healthy.  In between checkups, there are plenty of things you can do on your own. Experts have done a lot of research about which lifestyle changes and preventive measures are most likely to keep you healthy. Ask your health care provider for more information.  Weight and diet  Eat a healthy diet  Be sure to include plenty of vegetables, fruits, low-fat dairy products, and lean protein.  Do not eat a lot of foods high in solid fats, added sugars, or salt.  Get regular exercise. This is one of the most important things you can do for your health.  Most adults should exercise for at least 150 minutes each week. The exercise should increase your heart rate and make you sweat (moderate-intensity exercise).  Most adults should also do strengthening exercises at least twice a week. This is in addition to the moderate-intensity exercise.     Maintain a healthy weight  Body mass index (BMI) is a measurement that can be used to identify possible weight problems. It estimates body fat based on height and weight. Your health care provider can help determine your BMI and help you achieve or maintain a healthy weight.  For females 20 years of age and older:  A BMI below 18.5 is considered underweight.  A BMI of 18.5 to 24.9 is normal.  A BMI of 25 to 29.9 is considered overweight.  A BMI of 30 and above is considered obese.     Watch levels of cholesterol and blood lipids  You should start having your blood tested for lipids and cholesterol at 20 years of age, then have this test every 5 years.  You may need to have your cholesterol levels checked more often if:  Your lipid or cholesterol levels are  high.  You are older than 50 years of age.  You are at high risk for heart disease.     Cancer screening  Lung Cancer  Lung cancer screening is recommended for adults 55-80 years old who are at high risk for lung cancer because of a history of smoking.  A yearly low-dose CT scan of the lungs is recommended for people who:  Currently smoke.  Have quit within the past 15 years.  Have at least a 30-pack-year history of smoking. A pack year is smoking an average of one pack of cigarettes a day for 1 year.  Yearly screening should continue until it has been 15 years since you quit.  Yearly screening should stop if you develop a health problem that would prevent you from having lung cancer treatment.     Breast Cancer  Practice breast self-awareness. This means understanding how your breasts normally appear and feel.  It also means doing regular breast self-exams. Let your health care provider know about any changes, no matter how small.  If you are in your 20s or 30s, you should have a clinical breast exam (CBE) by a health care provider every 1-3 years as part of a regular health exam.  If you are 40 or older, have a CBE every year. Also consider having a breast X-ray (mammogram) every year.  If you have a family history of breast cancer, talk to your health care provider about genetic screening.  If you are at high risk for breast cancer, talk to your health care provider about having an MRI and a mammogram every year.  Breast cancer gene (BRCA) assessment is recommended for women who have family members with BRCA-related cancers. BRCA-related cancers include:  Breast.  Ovarian.  Tubal.  Peritoneal cancers.  Results of the assessment will determine the need for genetic counseling and BRCA1 and BRCA2 testing.     Cervical Cancer  Your health care provider may recommend that you be screened regularly for cancer of the pelvic organs (ovaries, uterus, and vagina). This screening involves a pelvic examination, including  checking for microscopic changes to the surface of your cervix (Pap test). You may be encouraged to have this screening done every 3 years, beginning at age 21.  For women ages 30-65, health care providers may recommend pelvic exams and Pap testing every 3 years, or they may recommend the Pap and pelvic exam, combined with testing for human papilloma virus (HPV), every 5 years. Some types of HPV increase your risk of cervical cancer. Testing for HPV may also be done on women of any age with unclear Pap test results.  Other health care providers may not recommend any screening for nonpregnant women who are considered low risk for pelvic cancer and who do not have symptoms. Ask your health care provider if a screening pelvic exam is right for you.  If you have had past treatment for cervical cancer or a condition that could lead to cancer, you need Pap tests and screening for cancer for at least 20 years after your treatment. If Pap tests have been discontinued, your risk factors (such as having a new sexual partner) need to be reassessed to determine if screening should resume. Some women have medical problems that increase the chance of getting cervical cancer. In these cases, your health care provider may recommend more frequent screening and Pap tests.     Colorectal Cancer  This type of cancer can be detected and often prevented.  Routine colorectal cancer screening usually begins at 50 years of age and continues through 75 years of age.  Your health care provider may recommend screening at an earlier age if you have risk factors for colon cancer.  Your health care provider may also recommend using home test kits to check for hidden blood in the stool.  A small camera at the end of a tube can be used to examine your colon directly (sigmoidoscopy or colonoscopy). This is done to check for the earliest forms of colorectal cancer.  Routine screening usually begins at age 50.  Direct examination of the colon should  be repeated every 5-10 years through 75 years of age. However, you may need to be screened more often if early forms of precancerous polyps or small growths are found.     Skin Cancer  Check your skin from head to toe regularly.  Tell your health care provider about any new moles or changes in moles, especially if there is a change in a mole's shape or color.  Also tell your health care provider if you have a mole that is larger than the size of a pencil eraser.  Always use sunscreen. Apply sunscreen liberally and repeatedly throughout the day.  Protect yourself by wearing long sleeves, pants, a wide-brimmed hat, and sunglasses whenever you are outside.     Heart disease, diabetes, and high blood pressure  High blood pressure causes heart disease and increases the risk of stroke. High blood pressure is more likely to develop in:  People who have blood pressure in the high end of the normal range (130-139/85-89 mm Hg).  People who are overweight or obese.  People who are .  If you are 18-39 years of age, have your blood pressure checked every 3-5 years. If you are 40 years of age or older, have your blood pressure checked every year. You should have your blood pressure measured twice--once when you are at a hospital or clinic, and once when you are not at a hospital or clinic. Record the average of the two measurements. To check your blood pressure when you are not at a hospital or clinic, you can use:  An automated blood pressure machine at a pharmacy.  A home blood pressure monitor.  If you are between 55 years and 79 years old, ask your health care provider if you should take aspirin to prevent strokes.  Have regular diabetes screenings. This involves taking a blood sample to check your fasting blood sugar level.  If you are at a normal weight and have a low risk for diabetes, have this test once every three years after 45 years of age.  If you are overweight and have a high risk for diabetes,  consider being tested at a younger age or more often.  Preventing infection  Hepatitis B  If you have a higher risk for hepatitis B, you should be screened for this virus. You are considered at high risk for hepatitis B if:  You were born in a country where hepatitis B is common. Ask your health care provider which countries are considered high risk.  Your parents were born in a high-risk country, and you have not been immunized against hepatitis B (hepatitis B vaccine).  You have HIV or AIDS.  You use needles to inject street drugs.  You live with someone who has hepatitis B.  You have had sex with someone who has hepatitis B.  You get hemodialysis treatment.  You take certain medicines for conditions, including cancer, organ transplantation, and autoimmune conditions.     Hepatitis C  Blood testing is recommended for:  Everyone born from 1945 through 1965.  Anyone with known risk factors for hepatitis C.     Sexually transmitted infections (STIs)  You should be screened for sexually transmitted infections (STIs) including gonorrhea and chlamydia if:  You are sexually active and are younger than 24 years of age.  You are older than 24 years of age and your health care provider tells you that you are at risk for this type of infection.  Your sexual activity has changed since you were last screened and you are at an increased risk for chlamydia or gonorrhea. Ask your health care provider if you are at risk.  If you do not have HIV, but are at risk, it may be recommended that you take a prescription medicine daily to prevent HIV infection. This is called pre-exposure prophylaxis (PrEP). You are considered at risk if:  You are sexually active and do not regularly use condoms or know the HIV status of your partner(s).  You take drugs by injection.  You are sexually active with a partner who has HIV.     Talk with your health care provider about whether you are at high risk of being infected with HIV. If you choose to  begin PrEP, you should first be tested for HIV. You should then be tested every 3 months for as long as you are taking PrEP.  Pregnancy  If you are premenopausal and you may become pregnant, ask your health care provider about preconception counseling.  If you may become pregnant, take 400 to 800 micrograms (mcg) of folic acid every day.  If you want to prevent pregnancy, talk to your health care provider about birth control (contraception).  Osteoporosis and menopause  Osteoporosis is a disease in which the bones lose minerals and strength with aging. This can result in serious bone fractures. Your risk for osteoporosis can be identified using a bone density scan.  If you are 65 years of age or older, or if you are at risk for osteoporosis and fractures, ask your health care provider if you should be screened.  Ask your health care provider whether you should take a calcium or vitamin D supplement to lower your risk for osteoporosis.  Menopause may have certain physical symptoms and risks.  Hormone replacement therapy may reduce some of these symptoms and risks.  Talk to your health care provider about whether hormone replacement therapy is right for you.  Follow these instructions at home:  Schedule regular health, dental, and eye exams.  Stay current with your immunizations.  Do not use any tobacco products including cigarettes, chewing tobacco, or electronic cigarettes.  If you are pregnant, do not drink alcohol.  If you are breastfeeding, limit how much and how often you drink alcohol.  Limit alcohol intake to no more than 1 drink per day for nonpregnant women. One drink equals 12 ounces of beer, 5 ounces of wine, or 1½ ounces of hard liquor.  Do not use street drugs.  Do not share needles.  Ask your health care provider for help if you need support or information about quitting drugs.  Tell your health care provider if you often feel depressed.  Tell your health care provider if you have ever been abused or do  not feel safe at home.  This information is not intended to replace advice given to you by your health care provider. Make sure you discuss any questions you have with your health care provider.  Document Released: 07/02/2012 Document Revised: 05/25/2017 Document Reviewed: 09/20/2016  [employer] Interactive Patient Education © 2018 [employer]

## 2023-11-22 NOTE — PROGRESS NOTES
"     New Patient Office Visit      Patient Name: Dacia Cui  : 1995   MRN: [id number]     Chief Complaint:    Chief Complaint   Patient presents with    Anxiety     Patient would like to discuss medication option.        History of Present Illness: Dacia Cui is a 28 y.o. female who is here today for get acquainted visit to establish care with a new provider. I have reviewed the patient's medical history in detail and updated the computerized patient record. Baseline screening tests were discussed today and pt agrees to discuss health maintenance and goals in future appointments.  Patient's chief concern today is establishing care and chronic history of anxiety and depression.    Anxiety: Patient complains of  anxiety mainly with some depression .  She has the following symptoms: difficulty concentrating, feelings of losing control, irritable, racing thoughts. Onset of symptoms was approximately unknown  patient remembers having some symptoms as a child with symptoms \"coming and going\" in adulthood.    Intermittent and situational  since that time. She denies current suicidal and homicidal ideation. Family history significant for no psychiatric illness.Possible organic causes contributing are: endocrine/metabolic. Risk factors: none Previous treatment includes  nonpharmacological management       Subjective      Review of Systems:   Review of Systems   Constitutional:  Negative for activity change, fatigue, unexpected weight gain and unexpected weight loss.   HENT:  Negative for congestion and sinus pressure.    Eyes:  Negative for blurred vision, double vision and visual disturbance.   Respiratory:  Negative for cough, chest tightness and shortness of breath.    Cardiovascular:  Negative for chest pain, palpitations and leg swelling.   Gastrointestinal:  Negative for abdominal pain, nausea and GERD.   Endocrine: Negative for polydipsia and polyuria.   Genitourinary:  Negative for dysuria. " "  Musculoskeletal:  Negative for arthralgias and myalgias.   Skin:  Negative for rash and bruise.   Allergic/Immunologic: Negative for environmental allergies and food allergies.   Neurological:  Negative for dizziness, weakness, numbness and headache.   Psychiatric/Behavioral:  Positive for agitation, decreased concentration and dysphoric mood. Negative for sleep disturbance, suicidal ideas and depressed mood. The patient is nervous/anxious.         Past Medical History:   Past Medical History:   Diagnosis Date    Otalgia     Pharyngitis     Recurrent    Scoliosis     Brace, 36 degrees       Past Surgical History:   Past Surgical History:   Procedure Laterality Date    EAR TUBES      TONSILLECTOMY AND ADENOIDECTOMY         Family History:   Family History   Problem Relation Age of Onset    Hypertension Father     Migraines Father     Esophageal cancer Paternal Grandfather        Social History:   Social History     Socioeconomic History    Marital status:    Tobacco Use    Smoking status: Never    Smokeless tobacco: Never   Vaping Use    Vaping Use: Never used   Substance and Sexual Activity    Alcohol use: Yes     Alcohol/week: 1.0 standard drink of alcohol     Types: 1 Glasses of wine per week    Drug use: No       Medications:   No current outpatient medications on file.    Allergies:   Allergies   Allergen Reactions    Coconut Hives       Objective     Vital Signs:   Vitals:    11/22/23 0844   BP: 100/70   BP Location: Left arm   Patient Position: Sitting   Cuff Size: Adult   Pulse: 77   Resp: 12   Temp: 97.5 °F (36.4 °C)   TempSrc: Oral   SpO2: 98%   Weight: 60.5 kg (133 lb 6.4 oz)   Height: 162.6 cm (64\")   PainSc: 0-No pain     Body mass index is 22.9 kg/m².   BMI is within normal parameters. No other follow-up for BMI required.       Physical Exam  Vitals and nursing note reviewed.   Constitutional:       General: She is not in acute distress.     Appearance: She is not toxic-appearing.   HENT:      " Head: Normocephalic and atraumatic.      Right Ear: Tympanic membrane, ear canal and external ear normal.      Left Ear: Tympanic membrane, ear canal and external ear normal.      Nose: Nose normal.      Mouth/Throat:      Mouth: Mucous membranes are moist.      Pharynx: No oropharyngeal exudate or posterior oropharyngeal erythema.   Eyes:      General: No scleral icterus.     Extraocular Movements: Extraocular movements intact.      Conjunctiva/sclera: Conjunctivae normal.      Pupils: Pupils are equal, round, and reactive to light.   Cardiovascular:      Rate and Rhythm: Normal rate and regular rhythm.      Pulses: Normal pulses.      Heart sounds: Normal heart sounds.   Pulmonary:      Effort: Pulmonary effort is normal. No respiratory distress.      Breath sounds: Normal breath sounds.   Abdominal:      General: Bowel sounds are normal. There is no distension.      Palpations: Abdomen is soft.      Tenderness: There is no abdominal tenderness.   Musculoskeletal:         General: Normal range of motion.      Cervical back: Normal range of motion and neck supple. No tenderness.      Left lower leg: No edema.   Lymphadenopathy:      Cervical: No cervical adenopathy.   Skin:     General: Skin is warm and dry.      Capillary Refill: Capillary refill takes less than 2 seconds.   Neurological:      General: No focal deficit present.      Mental Status: She is alert and oriented to person, place, and time.      Motor: No weakness.      Gait: Gait normal.   Psychiatric:         Mood and Affect: Mood normal.         Behavior: Behavior normal.         Thought Content: Thought content normal.         Judgment: Judgment normal.       PHQ-9 Total Score: 1     Assessment / Plan      Assessment/Plan:   Diagnoses and all orders for this visit:    1. Well adult exam (Primary)  Assessment & Plan:  Patient presents today for annual wellness exam  Patient chief complaint anxiety with some depression  Patient agrees to baseline  "laboratory testing today and follow-up visit to discuss health goals and close additional care gaps    Orders:  -     CBC Auto Differential  -     Comprehensive Metabolic Panel  -     Lipid Panel  -     TSH Rfx On Abnormal To Free T4  -     Hemoglobin A1c    2. Anxiety and depression  Assessment & Plan:  Anxiety: Patient complains of anxiety mainly with some depression.  She has the following symptoms: difficulty concentrating, feelings of losing control, irritable, racing thoughts. Onset of symptoms was many years ago with patient remembering having some symptoms of anxiety as a child, with symptoms \"coming and going\" in adulthood.  Intermittent and situational since that time and increased with her pregnancy. She denies current suicidal and homicidal ideation. Family history significant for no psychiatric illness.Possible organic causes contributing are: endocrine/metabolic. Risk factors: none Previous treatment includes nonpharmacological management  Patient agrees to baseline laboratory studies today  Patient is not necessarily interested in pharmacological at this time but is open to this in the future-patient currently breast-feeding  Patient educational materials regarding coping strategies for anxiety were attached to patient's MyChart and reviewed in her appointment today  Patient agrees to follow-up in a couple of weeks to discuss treatment plan going forward        Follow Up:   Return in about 2 weeks (around 12/6/2023).    BRENDAN Mena   [facility]     "

## 2023-11-23 LAB
ALBUMIN SERPL-MCNC: 4.8 G/DL (ref 4–5)
ALBUMIN/GLOB SERPL: 1.7 {RATIO} (ref 1.2–2.2)
ALP SERPL-CCNC: 69 IU/L (ref 44–121)
ALT SERPL-CCNC: 16 IU/L (ref 0–32)
AST SERPL-CCNC: 17 IU/L (ref 0–40)
BASOPHILS # BLD AUTO: 0.1 X10E3/UL (ref 0–0.2)
BASOPHILS NFR BLD AUTO: 1 %
BILIRUB SERPL-MCNC: 0.5 MG/DL (ref 0–1.2)
BUN SERPL-MCNC: 10 MG/DL (ref 6–20)
BUN/CREAT SERPL: 13 (ref 9–23)
CALCIUM SERPL-MCNC: 9.7 MG/DL (ref 8.7–10.2)
CHLORIDE SERPL-SCNC: 103 MMOL/L (ref 96–106)
CHOLEST SERPL-MCNC: 176 MG/DL (ref 100–199)
CO2 SERPL-SCNC: 25 MMOL/L (ref 20–29)
CREAT SERPL-MCNC: 0.75 MG/DL (ref 0.57–1)
EGFRCR SERPLBLD CKD-EPI 2021: 113 ML/MIN/1.73
EOSINOPHIL # BLD AUTO: 0.3 X10E3/UL (ref 0–0.4)
EOSINOPHIL NFR BLD AUTO: 4 %
ERYTHROCYTE [DISTWIDTH] IN BLOOD BY AUTOMATED COUNT: 13 % (ref 11.7–15.4)
GLOBULIN SER CALC-MCNC: 2.8 G/DL (ref 1.5–4.5)
GLUCOSE SERPL-MCNC: 81 MG/DL (ref 70–99)
HBA1C MFR BLD: 5.4 % (ref 4.8–5.6)
HCT VFR BLD AUTO: 40.2 % (ref 34–46.6)
HDLC SERPL-MCNC: 64 MG/DL
HGB BLD-MCNC: 13.1 G/DL (ref 11.1–15.9)
IMM GRANULOCYTES # BLD AUTO: 0 X10E3/UL (ref 0–0.1)
IMM GRANULOCYTES NFR BLD AUTO: 0 %
LDLC SERPL CALC-MCNC: 93 MG/DL (ref 0–99)
LYMPHOCYTES # BLD AUTO: 2.7 X10E3/UL (ref 0.7–3.1)
LYMPHOCYTES NFR BLD AUTO: 38 %
MCH RBC QN AUTO: 28.1 PG (ref 26.6–33)
MCHC RBC AUTO-ENTMCNC: 32.6 G/DL (ref 31.5–35.7)
MCV RBC AUTO: 86 FL (ref 79–97)
MONOCYTES # BLD AUTO: 0.5 X10E3/UL (ref 0.1–0.9)
MONOCYTES NFR BLD AUTO: 6 %
NEUTROPHILS # BLD AUTO: 3.6 X10E3/UL (ref 1.4–7)
NEUTROPHILS NFR BLD AUTO: 51 %
PLATELET # BLD AUTO: 320 X10E3/UL (ref 150–450)
POTASSIUM SERPL-SCNC: 4.4 MMOL/L (ref 3.5–5.2)
PROT SERPL-MCNC: 7.6 G/DL (ref 6–8.5)
RBC # BLD AUTO: 4.66 X10E6/UL (ref 3.77–5.28)
SODIUM SERPL-SCNC: 141 MMOL/L (ref 134–144)
TRIGL SERPL-MCNC: 104 MG/DL (ref 0–149)
TSH SERPL DL<=0.005 MIU/L-ACNC: 1.3 UIU/ML (ref 0.45–4.5)
VLDLC SERPL CALC-MCNC: 19 MG/DL (ref 5–40)
WBC # BLD AUTO: 7.1 X10E3/UL (ref 3.4–10.8)

## 2023-12-30 PROBLEM — R35.0 INCREASED FREQUENCY OF URINATION: Status: ACTIVE | Noted: 2023-12-30

## 2023-12-30 PROBLEM — R74.8 ELEVATED LIVER ENZYMES LEVEL DUE TO CYSTIC FIBROSIS: Status: ACTIVE | Noted: 2018-05-15

## 2023-12-30 PROBLEM — E84.8 ELEVATED LIVER ENZYMES LEVEL DUE TO CYSTIC FIBROSIS: Status: ACTIVE | Noted: 2018-05-15

## 2023-12-30 PROBLEM — R16.0 LARGE LIVER: Status: ACTIVE | Noted: 2018-05-15

## 2024-01-02 ENCOUNTER — OFFICE VISIT (OUTPATIENT)
Dept: FAMILY MEDICINE CLINIC | Facility: CLINIC | Age: 29
End: 2024-01-02
Payer: COMMERCIAL

## 2024-01-02 VITALS
HEIGHT: 64 IN | BODY MASS INDEX: 24.04 KG/M2 | DIASTOLIC BLOOD PRESSURE: 80 MMHG | SYSTOLIC BLOOD PRESSURE: 110 MMHG | WEIGHT: 140.8 LBS | HEART RATE: 74 BPM | RESPIRATION RATE: 16 BRPM | OXYGEN SATURATION: 98 %

## 2024-01-02 DIAGNOSIS — F41.9 ANXIETY AND DEPRESSION: Primary | ICD-10-CM

## 2024-01-02 DIAGNOSIS — F32.A ANXIETY AND DEPRESSION: Primary | ICD-10-CM

## 2024-01-02 DIAGNOSIS — Z23 NEED FOR VACCINATION: ICD-10-CM

## 2024-01-02 PROBLEM — E84.8 ELEVATED LIVER ENZYMES LEVEL DUE TO CYSTIC FIBROSIS: Status: RESOLVED | Noted: 2018-05-15 | Resolved: 2024-01-02

## 2024-01-02 PROBLEM — R74.8 ELEVATED LIVER ENZYMES LEVEL DUE TO CYSTIC FIBROSIS: Status: RESOLVED | Noted: 2018-05-15 | Resolved: 2024-01-02

## 2024-01-02 PROBLEM — R63.4 WEIGHT LOSS: Status: RESOLVED | Noted: 2022-09-19 | Resolved: 2024-01-02

## 2024-01-02 NOTE — PROGRESS NOTES
Follow Up Office Visit      Date:  2024   Patient Name: Dacia Cui  : 1995   MRN: [id number]     Chief Complaint:    Chief Complaint   Patient presents with    Anxiety     2 week follow up.        History of Present Illness: Dacia Cui is a 28 y.o. female who is here today to follow up related to symptoms of anxiety. Symptoms have improved somewhat and seem to be predictable around menstrual cycles. She is continuing to breast feed and would not like to start medications at this time. Pt to complete ADHD screening to determine if this may be source of anxiety.     HPI Notes:  Send educational video - Dr. Albright    Subjective      Past Medical History:   Past Medical History:   Diagnosis Date    Otalgia     Pharyngitis     Recurrent    Scoliosis     Brace, 36 degrees    Weight loss        Past Surgical History:   Past Surgical History:   Procedure Laterality Date    EAR TUBES      TONSILLECTOMY AND ADENOIDECTOMY         Family History:   Family History   Problem Relation Age of Onset    Hypertension Father     Migraines Father     Esophageal cancer Paternal Grandfather        Social History:   Social History     Socioeconomic History    Marital status:    Tobacco Use    Smoking status: Never    Smokeless tobacco: Never   Vaping Use    Vaping Use: Never used   Substance and Sexual Activity    Alcohol use: Yes     Alcohol/week: 1.0 standard drink of alcohol     Types: 1 Glasses of wine per week    Drug use: No       Medications:   No current outpatient medications on file.    Allergies:   Allergies   Allergen Reactions    Coconut Hives       Objective     Physical Exam  Vitals and nursing note reviewed.   Constitutional:       General: She is not in acute distress.     Appearance: Normal appearance. She is not toxic-appearing.   HENT:      Head: Normocephalic.      Mouth/Throat:      Mouth: Mucous membranes are moist.   Eyes:      Pupils: Pupils are equal, round, and reactive  "to light.   Pulmonary:      Effort: Pulmonary effort is normal. No respiratory distress.      Breath sounds: Normal breath sounds.   Skin:     General: Skin is warm and dry.   Neurological:      General: No focal deficit present.      Mental Status: She is alert and oriented to person, place, and time.   Psychiatric:         Mood and Affect: Mood normal.         Behavior: Behavior normal.         Thought Content: Thought content normal.         Judgment: Judgment normal.       Vitals:    01/02/24 0914   BP: 110/80   BP Location: Right arm   Patient Position: Sitting   Cuff Size: Adult   Pulse: 74   Resp: 16   SpO2: 98%   Weight: 63.9 kg (140 lb 12.8 oz)   Height: 162.6 cm (64.02\")   PainSc: 0-No pain     Body mass index is 24.16 kg/m².   BMI is within normal parameters. No other follow-up for BMI required.    PHQ-9 Total Score:       Assessment / Plan      Assessment/Plan:   Diagnoses and all orders for this visit:    1. Anxiety and depression (Primary)  Assessment & Plan:  Pt reports anxiety somewhat improved but still has issues with irritability   Pt still lactating and is not interested in medication at this time but is not opposed in the future.  Discussion about ADHD type symtoms and pt provided screening tool to completed and discuss.   No change in treatment regimen at this time.  Plan to re-evaluate at next routine f/u visit  Patient education materials attached to AVS. Materials were reviewed with patient during their office visit today and all questions addressed.      2. Need for vaccination  -     Tdap Vaccine Greater Than or Equal To 8yo IM       Follow Up:   Return in about 3 months (around 4/2/2024) for Recheck.    BRENDAN Mena ([place])  [facility] [place] [phone]  "

## 2024-01-18 NOTE — ASSESSMENT & PLAN NOTE
Pt reports anxiety somewhat improved but still has issues with irritability   Pt still lactating and is not interested in medication at this time but is not opposed in the future.  Discussion about ADHD type symtoms and pt provided screening tool to completed and discuss.   No change in treatment regimen at this time.  Plan to re-evaluate at next routine f/u visit  Patient education materials attached to AVS. Materials were reviewed with patient during their office visit today and all questions addressed.

## 2024-02-15 ENCOUNTER — TELEPHONE (OUTPATIENT)
Dept: FAMILY MEDICINE CLINIC | Facility: CLINIC | Age: 29
End: 2024-02-15
Payer: COMMERCIAL

## 2024-02-27 ENCOUNTER — OFFICE VISIT (OUTPATIENT)
Dept: FAMILY MEDICINE CLINIC | Facility: CLINIC | Age: 29
End: 2024-02-27
Payer: COMMERCIAL

## 2024-02-27 VITALS
BODY MASS INDEX: 24.53 KG/M2 | DIASTOLIC BLOOD PRESSURE: 68 MMHG | HEART RATE: 109 BPM | OXYGEN SATURATION: 98 % | HEIGHT: 64 IN | TEMPERATURE: 98.8 F | RESPIRATION RATE: 16 BRPM | WEIGHT: 143.7 LBS | SYSTOLIC BLOOD PRESSURE: 112 MMHG

## 2024-02-27 DIAGNOSIS — F41.9 ANXIETY AND DEPRESSION: Primary | ICD-10-CM

## 2024-02-27 DIAGNOSIS — F32.A ANXIETY AND DEPRESSION: Primary | ICD-10-CM

## 2024-02-27 RX ORDER — BUPROPION HYDROCHLORIDE 150 MG/1
150 TABLET ORAL DAILY
Qty: 30 TABLET | Refills: 2 | Status: SHIPPED | OUTPATIENT
Start: 2024-03-12

## 2024-02-27 RX ORDER — BUPROPION HYDROCHLORIDE 150 MG/1
150 TABLET ORAL DAILY
Qty: 14 TABLET | Refills: 0 | COMMUNITY
Start: 2024-02-27

## 2024-02-27 RX ORDER — BUPROPION HYDROCHLORIDE 150 MG/1
150 TABLET ORAL DAILY
Qty: 30 TABLET | Refills: 2 | Status: SHIPPED | OUTPATIENT
Start: 2024-02-27 | End: 2024-02-27

## 2024-02-27 NOTE — PROGRESS NOTES
Follow Up Office Visit      Date:  2024   Patient Name: Dacia Cui  : 1995   MRN: [id number]     Chief Complaint:    Chief Complaint   Patient presents with   • Annual Exam       History of Present Illness: Dacia Cui is a 28 y.o. female who is here today to discuss desire to explore symptoms of anxiety and if they could be related to ADHD. Pt completed screening questionnaire in prior visit indicating she may have some tendency toward ADHD.       Subjective      Past Medical History:   Past Medical History:   Diagnosis Date   • Otalgia    • Pharyngitis     Recurrent   • Scoliosis     Brace, 36 degrees   • Weight loss        Past Surgical History:   Past Surgical History:   Procedure Laterality Date   • EAR TUBES     • TONSILLECTOMY AND ADENOIDECTOMY         Family History:   Family History   Problem Relation Age of Onset   • Hypertension Father    • Migraines Father    • Esophageal cancer Paternal Grandfather        Social History:   Social History     Socioeconomic History   • Marital status:    Tobacco Use   • Smoking status: Never   • Smokeless tobacco: Never   Vaping Use   • Vaping Use: Never used   Substance and Sexual Activity   • Alcohol use: Yes     Alcohol/week: 1.0 standard drink of alcohol     Types: 1 Glasses of wine per week   • Drug use: No       Medications:   No current outpatient medications on file.    Allergies:   Allergies   Allergen Reactions   • Coconut Hives       Objective     Physical Exam  Vitals and nursing note reviewed.   Constitutional:       General: She is not in acute distress.     Appearance: Normal appearance. She is not toxic-appearing.   HENT:      Head: Normocephalic.   Eyes:      Pupils: Pupils are equal, round, and reactive to light.   Cardiovascular:      Rate and Rhythm: Normal rate and regular rhythm.      Heart sounds: No murmur heard.  Pulmonary:      Effort: Pulmonary effort is normal. No respiratory distress.      Breath sounds:  "Normal breath sounds.   Musculoskeletal:         General: Normal range of motion.      Cervical back: Normal range of motion and neck supple.      Right lower leg: No edema.      Left lower leg: No edema.   Skin:     General: Skin is warm and dry.   Neurological:      Mental Status: She is alert and oriented to person, place, and time.   Psychiatric:         Mood and Affect: Mood normal.         Behavior: Behavior normal.         Thought Content: Thought content normal.       Vitals:    02/27/24 0932   BP: 112/68   Pulse: 109   Resp: 16   Temp: 98.8 °F (37.1 °C)   TempSrc: Oral   SpO2: 98%   Weight: 65.2 kg (143 lb 11.2 oz)   Height: 162.6 cm (64.02\")     Body mass index is 24.65 kg/m².   BMI is within normal parameters. No other follow-up for BMI required.    PHQ-9 Total Score:       Assessment / Plan      Assessment/Plan:   Diagnoses and all orders for this visit:    1. Anxiety and depression (Primary)  Assessment & Plan:  Today Gaviota is in the office to discuss continued symptoms of anxiety and has been researching more information about ADHD and feels this may apply to her.   We discussed treatment options and that ADHD diagnosis would need to come from psych mental health provider.   Pt agreed to referral to mental health provider. Pt is weaning her daughter and expects to dc breastfeeding in the next 1-2 weeks.   Pt would like to start Wellbutrin while waiting to get in to see mental health provider.   Pt provided samples today (2 weeks) and prescription sent to pharmacy. Pt agrees to schedule f/u evaluation 4 weeks after start of medication.      Follow Up:   No follow-ups on file.    BRENDAN Mena ([place])  [facility] [address] [id number]  "

## 2024-02-28 NOTE — ASSESSMENT & PLAN NOTE
Today Gaviota is in the office to discuss continued symptoms of anxiety and has been researching more information about ADHD and feels this may apply to her.   We discussed treatment options and that ADHD diagnosis would need to come from psych mental health provider.   Pt agreed to referral to mental health provider. Pt is weaning her daughter and expects to dc breastfeeding in the next 1-2 weeks.   Pt would like to start Wellbutrin while waiting to get in to see mental health provider.   Pt provided samples today (2 weeks) and prescription sent to pharmacy. Pt agrees to schedule f/u evaluation 4 weeks after start of medication.

## 2024-04-19 ENCOUNTER — TELEPHONE (OUTPATIENT)
Dept: FAMILY MEDICINE CLINIC | Facility: CLINIC | Age: 29
End: 2024-04-19
Payer: COMMERCIAL

## 2024-04-19 DIAGNOSIS — S90.569A TICK BITE OF ANKLE, UNSPECIFIED LATERALITY, INITIAL ENCOUNTER: Primary | ICD-10-CM
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
## 2024-04-19 RX ORDER — DOXYCYCLINE HYCLATE 200 MG/1
200 TABLET, DELAYED RELEASE ORAL ONCE
Qty: 1 TABLET | Refills: 0 | Status: SHIPPED | OUTPATIENT
Start: 2024-04-19 | End: 2024-04-19

## 2024-04-19 NOTE — TELEPHONE ENCOUNTER
"Tick bite with swelling at the site and \"bullseye\" pattern rash following removal of tick.   Patient asymptomatic at this time, doxycycline single dose per orders.  Patient to follow-up if additional symptoms or if she has questions or concerns.  Patient confirms today that she stopped breast-feeding 2 weeks ago.  Medical record to be updated.  "

## 2025-05-05 ENCOUNTER — OFFICE VISIT (OUTPATIENT)
Dept: FAMILY MEDICINE CLINIC | Facility: CLINIC | Age: 30
End: 2025-05-05
Payer: COMMERCIAL

## 2025-05-05 VITALS
DIASTOLIC BLOOD PRESSURE: 74 MMHG | OXYGEN SATURATION: 98 % | HEART RATE: 61 BPM | BODY MASS INDEX: 25.93 KG/M2 | WEIGHT: 151.9 LBS | SYSTOLIC BLOOD PRESSURE: 98 MMHG | HEIGHT: 64 IN

## 2025-05-05 DIAGNOSIS — H66.014 RECURRENT ACUTE SUPPURATIVE OTITIS MEDIA OF RIGHT EAR WITH SPONTANEOUS RUPTURE OF TYMPANIC MEMBRANE: Primary | ICD-10-CM

## 2025-05-05 DIAGNOSIS — Z96.22 PATENT TYMPANOSTOMY TUBE: ICD-10-CM

## 2025-05-05 PROCEDURE — 99213 OFFICE O/P EST LOW 20 MIN: CPT | Performed by: PHYSICIAN ASSISTANT

## 2025-05-05 RX ORDER — CIPROFLOXACIN AND DEXAMETHASONE 3; 1 MG/ML; MG/ML
4 SUSPENSION/ DROPS AURICULAR (OTIC) 2 TIMES DAILY
Qty: 7.5 ML | Refills: 1 | Status: SHIPPED | OUTPATIENT
Start: 2025-05-05

## 2025-05-05 NOTE — PROGRESS NOTES
"      Patient Office Visit      Patient Name: Dacia Cui  : 1995   MRN: [id number]     Chief Complaint:    Chief Complaint   Patient presents with    Earache     R. X3D.        History of Present Illness: Dacia Cui is a 30 y.o. female who is here today complaining of right earache and drainage.  Long history of ear infections.  She has tubes in both ears.  A little bit of stuffy nose but otherwise no other upper respiratory symptoms.    Subjective      Review of Systems:   Review of Systems   Constitutional:  Negative for chills and fever.   HENT:  Negative for rhinorrhea.    Respiratory:  Negative for cough.         Past Medical History:   Past Medical History:   Diagnosis Date    Otalgia     Pharyngitis     Recurrent    Scoliosis     Brace, 36 degrees    Weight loss 2022       Past Surgical History:   Past Surgical History:   Procedure Laterality Date    EAR TUBES      TONSILLECTOMY AND ADENOIDECTOMY         Family History:   Family History   Problem Relation Age of Onset    Hypertension Father     Migraines Father     Esophageal cancer Paternal Grandfather     Bipolar disorder Paternal Grandfather        Social History:   Social History     Socioeconomic History    Marital status:    Tobacco Use    Smoking status: Never     Passive exposure: Never    Smokeless tobacco: Never   Vaping Use    Vaping status: Never Used   Substance and Sexual Activity    Alcohol use: Yes     Alcohol/week: 1.0 standard drink of alcohol     Types: 1 Glasses of wine per week    Drug use: Never    Sexual activity: Defer       Allergies:   Allergies   Allergen Reactions    Coconut Hives       Objective     Physical Exam:  Vital Signs:   Vitals:    25 1356   BP: 98/74   BP Location: Left arm   Patient Position: Sitting   Cuff Size: Adult   Pulse: 61   SpO2: 98%   Weight: 68.9 kg (151 lb 14.4 oz)   Height: 162.6 cm (64\")   PainSc: 3    PainLoc: Ear     Body mass index is 26.07 kg/m².        Physical " Exam  Constitutional:       General: She is not in acute distress.     Appearance: Normal appearance.   HENT:      Right Ear: Drainage present. A PE tube is present.      Left Ear: A PE tube is present.      Ears:      Comments: Purulent drainage from the right TM     Nose: Nose normal.   Neurological:      Mental Status: She is alert.         Procedures    Assessment / Plan      Assessment/Plan:   Diagnoses and all orders for this visit:    1. Recurrent acute suppurative otitis media of right ear with spontaneous rupture of tympanic membrane (Primary)  -     ciprofloxacin-dexAMETHasone (Ciprodex) 0.3-0.1 % otic suspension; Administer 4 drops to the right ear 2 (Two) Times a Day.  Dispense: 7.5 mL; Refill: 1  -     Culture, Routine - Swab, Ear, Right    2. Patent tympanostomy tube  -     ciprofloxacin-dexAMETHasone (Ciprodex) 0.3-0.1 % otic suspension; Administer 4 drops to the right ear 2 (Two) Times a Day.  Dispense: 7.5 mL; Refill: 1  -     Culture, Routine - Swab, Ear, Right       Ciprodex and send out culture from the drainage from the ear.  Patient to notify me if not improving.  If not improving will plan to treat with oral antibiotics guided by culture results.    Medications:     Current Outpatient Medications:     ciprofloxacin-dexAMETHasone (Ciprodex) 0.3-0.1 % otic suspension, Administer 4 drops to the right ear 2 (Two) Times a Day., Disp: 7.5 mL, Rfl: 1        Follow Up:   No follow-ups on file.    Becca Wong PA-C   [facility] [place] [facility]     NOTE TO PATIENT: The 21st Century Cures Act makes medical notes like these available to patients in the interest of transparency. However, be advised this is a medical document. It is intended as peer to peer communication. It is written in medical language and may contain abbreviations or verbiage that are unfamiliar. It may appear blunt or direct. Medical documents are intended to carry relevant information, facts as evident, and the clinical  opinion of the practitioner.

## 2025-05-08 LAB
BACTERIA SPEC CULT: NORMAL
MICROORGANISM/AGENT SPEC: NORMAL